# Patient Record
Sex: MALE | Race: WHITE | NOT HISPANIC OR LATINO | Employment: FULL TIME | URBAN - NONMETROPOLITAN AREA
[De-identification: names, ages, dates, MRNs, and addresses within clinical notes are randomized per-mention and may not be internally consistent; named-entity substitution may affect disease eponyms.]

---

## 2018-02-02 ENCOUNTER — DOCUMENTATION ONLY (OUTPATIENT)
Dept: FAMILY MEDICINE | Facility: OTHER | Age: 23
End: 2018-02-02

## 2018-02-02 PROBLEM — J30.1 ALLERGIC RHINITIS DUE TO POLLEN: Status: ACTIVE | Noted: 2018-02-02

## 2018-02-02 PROBLEM — E66.3 OVERWEIGHT: Status: ACTIVE | Noted: 2018-02-02

## 2018-02-02 RX ORDER — IBUPROFEN 800 MG/1
800 TABLET, FILM COATED ORAL 3 TIMES DAILY PRN
COMMUNITY
Start: 2014-03-12 | End: 2019-06-26

## 2018-02-02 RX ORDER — LORATADINE 10 MG/1
10 TABLET ORAL DAILY
COMMUNITY
Start: 2016-08-19 | End: 2019-10-07

## 2019-06-26 ENCOUNTER — OFFICE VISIT (OUTPATIENT)
Dept: FAMILY MEDICINE | Facility: OTHER | Age: 24
End: 2019-06-26
Attending: NURSE PRACTITIONER
Payer: COMMERCIAL

## 2019-06-26 VITALS
SYSTOLIC BLOOD PRESSURE: 126 MMHG | HEART RATE: 84 BPM | RESPIRATION RATE: 20 BRPM | WEIGHT: 305.1 LBS | DIASTOLIC BLOOD PRESSURE: 78 MMHG | BODY MASS INDEX: 46.24 KG/M2 | HEIGHT: 68 IN | TEMPERATURE: 98.8 F

## 2019-06-26 DIAGNOSIS — L25.9 CONTACT DERMATITIS, UNSPECIFIED CONTACT DERMATITIS TYPE, UNSPECIFIED TRIGGER: Primary | ICD-10-CM

## 2019-06-26 PROCEDURE — 99213 OFFICE O/P EST LOW 20 MIN: CPT | Performed by: NURSE PRACTITIONER

## 2019-06-26 PROCEDURE — G0463 HOSPITAL OUTPT CLINIC VISIT: HCPCS

## 2019-06-26 RX ORDER — HYDROCODONE BITARTRATE AND ACETAMINOPHEN 7.5; 325 MG/1; MG/1
TABLET ORAL
Refills: 0 | COMMUNITY
Start: 2019-06-14 | End: 2020-04-09

## 2019-06-26 RX ORDER — TRIAMCINOLONE ACETONIDE 1 MG/G
OINTMENT TOPICAL 2 TIMES DAILY
Qty: 60 G | Refills: 0 | Status: SHIPPED | OUTPATIENT
Start: 2019-06-26 | End: 2019-10-07

## 2019-06-26 ASSESSMENT — MIFFLIN-ST. JEOR: SCORE: 2343.42

## 2019-06-26 ASSESSMENT — PAIN SCALES - GENERAL: PAINLEVEL: NO PAIN (0)

## 2019-06-26 NOTE — PATIENT INSTRUCTIONS
"Patient Education     Contact Dermatitis  Contact dermatitis is a skin rash caused by something that touches the skin and makes it irritated and inflamed. Your skin may be red, swollen, dry, and may be cracked. Blisters may form and ooze. The rash will itch.  Contact dermatitis can form on the face and neck, backs of hands, forearms, genitals, and lower legs.  People can get contact dermatitis from lots of sources. These include:    Plants such as poison ivy, oak, or sumac    Chemicals in hair dyes and rinses, soaps, solvents, waxes, fingernail polish, and deodorants     Jewelry or watchbands made of nickel  Contact dermatitis is not passed from person to person.  Talk with your healthcare provider about what may have caused the rash. A type of allergy testing called \"patch testing\" may be used to discover what you are allergic to. You will need to avoid the source of your rash in the future to prevent it from coming back.  Treatment is done to relieve itching and prevent the rash from coming back. The rash should go away in a few days to a few weeks.  Home care  Your healthcare provider may prescribe medicine to relieve swelling and itching. Follow all instructions when using these medicines.  General care:    Avoid anything that heats up your skin, such as hot showers or baths, or direct sunlight. This can make itching worse.    Apply cold compresses to soothe your sores to help relieve your symptoms. Do this for 30 minutes 3 to 4 times a day. You can make a cold compress by soaking a cloth in cold water. Squeeze out excess water. You can add colloidal oatmeal to the water to help reduce itching. For severe itching in a small area, apply an ice pack wrapped in a thin towel. Do this for 20 minutes 3 to 4 times a day.    You can also try wet dressings. One way to do this is to wear a wet piece of clothing under a dry one. Wear a damp shirt under a dry shirt if your upper body is affected. This can relieve itching " and prevent you from scratching the affected area.    You can also help relieve large areas of itching by taking a lukewarm bath with colloidal oatmeal added to the water.    Use hydrocortisone cream for redness and irritation, unless another medicine was prescribed. You can also use benzocaine anesthetic cream or spray. Calamine lotion can also relieve mild symptoms.    Use oral diphenhydramine to help reduce itching. You can buy this antihistamine at drug and grocery stores. It can make you sleepy, so use lower doses during the daytime. Or you can use loratadine. This is an antihistamine that will not make you sleepy. Do not use diphenhydramine if you have glaucoma or have trouble urinating due to an enlarged prostate.    If a plant causes your rash, make sure to wash your skin and the clothes you were wearing when you came into contact with the plant. This is to wash away the plant oils that gave you the rash and prevent more or worse symptoms.    Stay away from the substance or object that causes your symptoms. If you can t avoid it, wear gloves or some other type of protection.  Follow-up care  Follow up with your healthcare provider, or as advised.  When to seek medical advice  Call your healthcare provider right away if any of these occur:    Spreading of the rash to other parts of your body    Severe swelling of your face, eyelids, mouth, throat or tongue    Trouble urinating due to swelling in the genital area    Fever of 100.4 F (38 C) or higher    Redness or swelling that gets worse    Pain that gets worse    Foul-smelling fluid leaking from the skin    Yellow-brown crusts on the open blisters  Date Last Reviewed: 9/1/2016 2000-2018 [employer]. [address] [place] [address], [place] [address]. All rights reserved. This information is not intended as a substitute for professional medical care. Always follow your healthcare professional's instructions.

## 2019-06-26 NOTE — NURSING NOTE
Patient presents to the clinic for rash on arms that started last night. Patient reports the rash being itchy and has put on isopropyl for treatment.  Medication Reconciliation: complete    Shirley Cobian, CMA

## 2019-06-26 NOTE — PROGRESS NOTES
Nursing Notes:   Shirley Cobian CMA  6/26/2019  4:07 PM  Sign at exiting of workspace  Patient presents to the clinic for rash on arms that started last night. Patient reports the rash being itchy and has put on isopropyl for treatment.  Medication Reconciliation: complete    Shirley Cobian CMA        SUBJECTIVE:   Nathan Wells is a 24 year old male who presents to clinic today for the following health issues:    Patient presents to the [facility] this evening with a rash on his arms.  He reports that it has been there for the last 5 days.  He reports it is itchy.  There is no pain.  He denies contact with anything new in his house, there is no new soaps, detergents, closed, household products or food.  No new pets or pet products.  However he did start a new job and there could be something there that is causing the rash.  There is no fevers, chills, cough, shortness of breath or wheezing, no nasal congestion.      Problem list and histories reviewed & adjusted, as indicated.  Additional history: as documented    Patient Active Problem List   Diagnosis     Allergic rhinitis due to pollen     Ankle pain     Overweight     Past Surgical History:   Procedure Laterality Date     ADENOIDECTOMY      No Comments Provided     MYRINGOTOMY, INSERT TUBE, COMBINED      12/27/05,Vent tubes   4 times.       Social History     Tobacco Use     Smoking status: Never Smoker     Smokeless tobacco: Never Used   Substance Use Topics     Alcohol use: No     Family History   Problem Relation Age of Onset     Family History Negative Mother         Good Health     Cancer Father         Cancer,brain cancer     Heart Disease Other         Heart Disease         Current Outpatient Medications   Medication Sig Dispense Refill     triamcinolone (KENALOG) 0.1 % external ointment Apply topically 2 times daily for 10 days 60 g 0     HYDROcodone-acetaminophen (NORCO) 7.5-325 MG per tablet TAKE 1 TAB BY MOUTH EVERY 4-6 HOURS AS NEEDED FOR  "PAIN  0     loratadine (CLARITIN) 10 MG tablet Take 10 mg by mouth daily       Allergies   Allergen Reactions     Erythromycin-Sulfisoxazole Unknown     Pediatric Multivitamins-Iron Unknown     Sulfa Drugs Unknown     Allergies to pediazole     Amoxicillin Rash         ROS:  Notable findings in the HPI.       OBJECTIVE:     /78 (BP Location: Left arm, Patient Position: Sitting, Cuff Size: Adult Large)   Pulse 84   Temp 98.8  F (37.1  C) (Tympanic)   Resp 20   Ht 1.727 m (5' 8\")   Wt 138.4 kg (305 lb 1.6 oz)   BMI 46.39 kg/m    Body mass index is 46.39 kg/m .  GENERAL: healthy, alert and no distress  EYES: Eyes grossly normal to inspection  HENT: normal cephalic/atraumatic and oral mucous membranes moist  RESP: Without increased work of breathing  CV: regular rates and rhythm and no peripheral edema  SKIN:  Examination of the rash reveals: Eczema:  dry, slightly raised, red patches with mild flaking noted on posterior surface of his forearms.   PSYCH: mentation appears normal, affect normal/bright    Diagnostic Test Results:  none     ASSESSMENT/PLAN:     1. Contact dermatitis, unspecified contact dermatitis type, unspecified trigger  - triamcinolone (KENALOG) 0.1 % external ointment; Apply topically 2 times daily for 10 days  Dispense: 60 g; Refill: 0      PLAN:    Rash:  moisturizer  Reassurance was given to the patient  Zyrtec 10mg daily suggested for itchy rash.  Rx    Followup:    If not improving or if condition worsens, follow up with your Primary Care Provider    I explained my diagnostic considerations and recommendations to the patient, who voiced understanding and agreement with the treatment plan. All questions were answered. We discussed potential side effects of any prescribed or recommended therapies, as well as expectations for response to treatments. He was advised to contact our office if there is no improvement or worsening of conditions or symptoms.  If s/s worsen or persist, patient " will either come back or follow up with PCP.    Disclaimer:  This note consists of words and symbols derived from keyboarding, dictation, or using voice recognition software. As a result, there may be errors in the script that have gone undetected. Please consider this when interpreting information found in this note.      Barbara Raza NP, 6/26/2019 4:11 PM

## 2019-10-07 ENCOUNTER — OFFICE VISIT (OUTPATIENT)
Dept: FAMILY MEDICINE | Facility: OTHER | Age: 24
End: 2019-10-07
Attending: NURSE PRACTITIONER
Payer: COMMERCIAL

## 2019-10-07 VITALS
BODY MASS INDEX: 47.27 KG/M2 | HEART RATE: 101 BPM | HEIGHT: 68 IN | SYSTOLIC BLOOD PRESSURE: 128 MMHG | RESPIRATION RATE: 20 BRPM | TEMPERATURE: 98.4 F | DIASTOLIC BLOOD PRESSURE: 76 MMHG | OXYGEN SATURATION: 96 % | WEIGHT: 311.9 LBS

## 2019-10-07 DIAGNOSIS — J30.2 SEASONAL ALLERGIC RHINITIS, UNSPECIFIED TRIGGER: ICD-10-CM

## 2019-10-07 DIAGNOSIS — L30.4 INTERTRIGO: Primary | ICD-10-CM

## 2019-10-07 PROCEDURE — 99213 OFFICE O/P EST LOW 20 MIN: CPT | Performed by: NURSE PRACTITIONER

## 2019-10-07 PROCEDURE — G0463 HOSPITAL OUTPT CLINIC VISIT: HCPCS

## 2019-10-07 RX ORDER — NYSTATIN 100000 U/G
OINTMENT TOPICAL 2 TIMES DAILY
Qty: 60 G | Refills: 0 | Status: SHIPPED | OUTPATIENT
Start: 2019-10-07 | End: 2020-05-24

## 2019-10-07 RX ORDER — LORATADINE 10 MG/1
10 TABLET ORAL DAILY
Qty: 90 TABLET | Refills: 0 | Status: SHIPPED | OUTPATIENT
Start: 2019-10-07 | End: 2022-05-17

## 2019-10-07 ASSESSMENT — PAIN SCALES - GENERAL: PAINLEVEL: NO PAIN (0)

## 2019-10-07 ASSESSMENT — MIFFLIN-ST. JEOR: SCORE: 2374.27

## 2019-10-07 NOTE — NURSING NOTE
"Chief Complaint   Patient presents with     Derm Problem     left armpit     Patient is here for an odor in his left armpit that was noticed 3 days ago. Patient states that deodorent is not helping. Patient states no more itching than usual and no discharge from the armpit.     Initial /76   Pulse 101   Temp 98.4  F (36.9  C) (Tympanic)   Resp 20   Ht 1.727 m (5' 8\")   Wt 141.5 kg (311 lb 14.4 oz)   SpO2 96%   BMI 47.42 kg/m   Estimated body mass index is 47.42 kg/m  as calculated from the following:    Height as of this encounter: 1.727 m (5' 8\").    Weight as of this encounter: 141.5 kg (311 lb 14.4 oz).  Medication Reconciliation: complete    Jerilyn Ames LPN  "

## 2019-10-07 NOTE — PROGRESS NOTES
"HPI:    Nathan Wells is a 24 year old male  who presents to clinic today for skin concern.    Noted some mild odor from his left axillary area a few days ago.  Describes some mild irritation.  No burning sensation.  Not itching.  Mild stinging with hot water.  No new deodorants.  No topical treatments tried.       Past Medical History:   Diagnosis Date     Constipation     3/23/05,started on MiraLax and behavioral modification treatments     Encounter for adjustment and management of other implanted devices     3/23/05,T tube is out     Otitis media     History of recurrent ear infections     Past Surgical History:   Procedure Laterality Date     ADENOIDECTOMY      No Comments Provided     MYRINGOTOMY, INSERT TUBE, COMBINED      12/27/05,Vent tubes   4 times.     Social History     Tobacco Use     Smoking status: Never Smoker     Smokeless tobacco: Never Used   Substance Use Topics     Alcohol use: No     Current Outpatient Medications   Medication Sig Dispense Refill     loratadine (CLARITIN) 10 MG tablet Take 10 mg by mouth daily       HYDROcodone-acetaminophen (NORCO) 7.5-325 MG per tablet TAKE 1 TAB BY MOUTH EVERY 4-6 HOURS AS NEEDED FOR PAIN  0     Allergies   Allergen Reactions     Erythromycin-Sulfisoxazole Unknown     Pediatric Multivitamins-Iron Unknown     Sulfa Drugs Unknown     Allergies to pediazole     Amoxicillin Rash         Past medical history, past surgical history, current medications and allergies reviewed and accurate to the best of my knowledge.        ROS:  Refer to HPI    /76   Pulse 101   Temp 98.4  F (36.9  C) (Tympanic)   Resp 20   Ht 1.727 m (5' 8\")   Wt 141.5 kg (311 lb 14.4 oz)   SpO2 96%   BMI 47.42 kg/m      EXAM:  General Appearance: Well appearing mildly obese adult male, appropriate appearance for age. No acute distress  Orophayrnx: voice clear  Respiratory:  Normal effort. No cough appreciated.  Musculoskeletal:  Equal movement of bilateral upper extremities.  " Equal movement of bilateral lower extremities.  Normal gait.    Dermatological: Left axillary area with erythematous macerated plaque  Psychological: normal affect, alert and pleasant          ASSESSMENT/PLAN:    ICD-10-CM    1. Intertrigo L30.4 nystatin (MYCOSTATIN) 280413 UNIT/GM external ointment   2. Seasonal allergic rhinitis, unspecified trigger J30.2 loratadine (CLARITIN) 10 MG tablet       Nystatin ointment BID to left axillary area  Keep skin folds cool and dry  Follow up if symptoms persist or worsen or concerns    Patient requested refill of Loratadine for seasonal allergies.  Rx for 90 day supply.            Disclaimer:  This note consists of words and symbols derived from keyboarding, dictation, or using voice recognition software. As a result, there may be errors in the script that have gone undetected. Please consider this when interpreting information found in this note.

## 2020-02-22 ENCOUNTER — OFFICE VISIT (OUTPATIENT)
Dept: FAMILY MEDICINE | Facility: OTHER | Age: 25
End: 2020-02-22
Attending: NURSE PRACTITIONER
Payer: COMMERCIAL

## 2020-02-22 VITALS
WEIGHT: 315 LBS | BODY MASS INDEX: 47.74 KG/M2 | TEMPERATURE: 98.7 F | HEIGHT: 68 IN | RESPIRATION RATE: 16 BRPM | HEART RATE: 120 BPM | SYSTOLIC BLOOD PRESSURE: 130 MMHG | DIASTOLIC BLOOD PRESSURE: 78 MMHG | OXYGEN SATURATION: 96 %

## 2020-02-22 DIAGNOSIS — J02.0 STREPTOCOCCAL SORE THROAT: ICD-10-CM

## 2020-02-22 DIAGNOSIS — R07.0 THROAT PAIN: Primary | ICD-10-CM

## 2020-02-22 LAB
SPECIMEN SOURCE: ABNORMAL
STREP GROUP A PCR: ABNORMAL

## 2020-02-22 PROCEDURE — G0463 HOSPITAL OUTPT CLINIC VISIT: HCPCS

## 2020-02-22 PROCEDURE — 99213 OFFICE O/P EST LOW 20 MIN: CPT | Performed by: NURSE PRACTITIONER

## 2020-02-22 PROCEDURE — 87651 STREP A DNA AMP PROBE: CPT | Mod: ZL | Performed by: NURSE PRACTITIONER

## 2020-02-22 RX ORDER — AZITHROMYCIN 250 MG/1
TABLET, FILM COATED ORAL
Qty: 6 TABLET | Refills: 0 | Status: SHIPPED | OUTPATIENT
Start: 2020-02-22 | End: 2020-02-29

## 2020-02-22 RX ORDER — AZITHROMYCIN 250 MG/1
TABLET, FILM COATED ORAL
Qty: 6 TABLET | Refills: 0 | Status: SHIPPED | OUTPATIENT
Start: 2020-02-22 | End: 2020-02-22

## 2020-02-22 ASSESSMENT — MIFFLIN-ST. JEOR: SCORE: 2396.5

## 2020-02-22 ASSESSMENT — PAIN SCALES - GENERAL: PAINLEVEL: NO PAIN (1)

## 2020-02-22 NOTE — NURSING NOTE
Patient has not been feeling well for 11 hours.  Their symptoms are sore throat.   Sarika Colindres LPN LPN....................  2/22/2020   5:40 PM

## 2020-02-23 NOTE — PATIENT INSTRUCTIONS
Sore Throat   What is a sore throat?   Sore throat is a common symptom that ranges in severity from just a sense of scratchiness to severe pain.   Pharyngitis is the medical term for sore throat.   How does it occur?   Sore throat is caused by inflammation of the throat (pharynx). The pharynx is the area behind the tonsils. A sore throat may be the first symptom of usually mild illnesses such as a cold or the flu or of more severe illnesses such as mononucleosis or scarlet fever.   A sore throat that comes on suddenly is called acute pharyngitis. It can be caused by bacteria or viruses. A sore throat that lasts for a long time is called chronic pharyngitis. It occurs when a respiratory, sinus, or mouth infection spreads to the throat.   Sore throats can also be caused by:   hay fever   cigarette smoking or secondhand smoke   breathing heavily polluted air or chemical fumes   swallowing sharp foods that hurt the lining of the throat, such as a tortilla chip   dry air   heartburn (gastric reflux).   What are the symptoms?   Symptoms may include:   a raw feeling in the throat that makes breathing, swallowing, and speaking painful   redness of the throat   fever   hoarseness   pus in your throat   tender, swollen glands in your neck   earache (you may feel pain in your ears even though the problem is in your throat).   How is it diagnosed?   Your health care provider will ask about your symptoms and examine your throat. Your provider also will examine you for signs of other illness, such as sinus, chest, or ear infections.   Just by looking at your throat, it is often hard for your health care provider to decide whether a virus or bacteria are causing your sore throat. Your provider may swab your throat to test for strep infection.   How is it treated?   Usually no specific medical treatment is needed if a virus is causing the sore throat. The throat most often gets better on its own within 5 to 7 days. Antibiotic  medicine does not cure viral pharyngitis.   For acute pharyngitis caused by bacteria, your health care provider may prescribe an antibiotic.   For chronic pharyngitis, your provider will look for other causes.   How long will the effects last?   Viral pharyngitis often goes away in 5 to 7 days.   If you have bacterial pharyngitis, you will feel better after you have taken antibiotics for 2 to 3 days. You must, though, take all of your antibiotic even when you are feeling better. If you don't take all of it, your sore throat could come back.   How can I take care of myself?   Do not smoke.   Avoid secondhand smoke and other air pollutants.   Use a cool mist humidifier to add moisture to the air.   Get plenty of rest.   You may want to rest your throat by talking less and eating a diet that is mostly liquid or soft for a day or two.   Nonprescription throat lozenges and mouthwashes should help relieve the soreness.   Gargling with warm saltwater and drinking warm liquids may help. (You can make a saltwater solution by adding a half teaspoon of salt to 8 ounces of warm water.)   A nonprescription pain reliever such as aspirin, acetaminophen, or ibuprofen may ease general aches and pains. Children under 18 years of age should not take aspirin or products containing salicylate (such as Pepto-Bismol) because of the risk of Reye's syndrome unless recommended by a healthcare provider.   If your sore throat lasts for more then a few days, call your health care provider.   How can I prevent a sore throat?   The following suggestions may help prevent a sore throat:   Don't share eating and drinking utensils with others.   Wash your hands often.   Don't let your nose or mouth touch public telephones or drinking fountains.   Avoid close contact with other people who have a sore throat.   Stay indoors as much as possible on high-pollution days.   Don't stay in areas where there is heavy smoke from cigarettes.   Use a humidifier  in your home if the air is quite dry.   Copyright   2006 [employer] and/or one of its subsidiaries. All Rights Reserved.

## 2020-02-23 NOTE — PROGRESS NOTES
Nursing Notes:   Sarika Colindres LPN  2/22/2020  6:04 PM  Signed  Patient has not been feeling well for 11 hours.  Their symptoms are sore throat.   Sarika Colindres LPN LPN....................  2/22/2020   5:40 PM        SUBJECTIVE:   Nathan Wells is a 24 year old male who presents to clinic today for the following health issues:    Patient presents to the rapid clinic for evaluation of illness.  He has had sore throat and throat irritation for the last 2 days worsening for the last 12 hours.  He denies fevers, chills, cough, shortness of breath or wheezing.  There is no nasal congestion.  He is eating and drinking well.  He has been around 2 others that have been diagnosed with strep throat recently.      Problem list and histories reviewed & adjusted, as indicated.  Additional history: as documented    Patient Active Problem List   Diagnosis     Allergic rhinitis due to pollen     Ankle pain     Overweight     Past Surgical History:   Procedure Laterality Date     ADENOIDECTOMY      No Comments Provided     MYRINGOTOMY, INSERT TUBE, COMBINED      12/27/05,Vent tubes   4 times.       Social History     Tobacco Use     Smoking status: Never Smoker     Smokeless tobacco: Never Used   Substance Use Topics     Alcohol use: No     Family History   Problem Relation Age of Onset     Family History Negative Mother         Good Health     Cancer Father         Cancer,brain cancer     Heart Disease Other         Heart Disease         Current Outpatient Medications   Medication Sig Dispense Refill     azithromycin (ZITHROMAX Z-SHERIF) 250 MG tablet Take 500 mg day one and days 2-5 take 250 mg 6 tablet 0     HYDROcodone-acetaminophen (NORCO) 7.5-325 MG per tablet TAKE 1 TAB BY MOUTH EVERY 4-6 HOURS AS NEEDED FOR PAIN  0     loratadine (CLARITIN) 10 MG tablet Take 1 tablet (10 mg) by mouth daily 90 tablet 0     nystatin (MYCOSTATIN) 664995 UNIT/GM external ointment Apply topically 2 times daily 60 g 0     Allergies  "  Allergen Reactions     Erythromycin-Sulfisoxazole Unknown     Pediatric Multivitamins-Iron Unknown     Sulfa Drugs Unknown     Allergies to pediazole     Amoxicillin Rash         ROS:  Notable findings in the HPI.       OBJECTIVE:     /78 (BP Location: Right arm, Patient Position: Sitting, Cuff Size: Adult Regular)   Pulse 120   Temp 98.7  F (37.1  C) (Tympanic)   Resp 16   Ht 1.727 m (5' 8\")   Wt 143.7 kg (316 lb 12.8 oz)   SpO2 96%   BMI 48.17 kg/m    Body mass index is 48.17 kg/m .  GENERAL: alert and no distress  EYES: Eyes grossly normal to inspection  HENT: normal cephalic/atraumatic, right ear: normal: no effusions, no erythema, normal landmarks, left ear: normal: no effusions, no erythema, normal landmarks, nose and mouth without ulcers or lesions, oropharynx clear, oral mucous membranes moist and Uvula arises midline. No exudate, no excessive redness tonsils are +1 and equal.   NECK: no adenopathy  RESP: lungs clear to auscultation - no rales, rhonchi or wheezes  CV: regular rates and rhythm, normal S1 S2, no S3 or S4, no murmur, click or rub, peripheral pulses strong and no peripheral edema  SKIN: no suspicious lesions or rashes  PSYCH: mentation appears normal, affect normal/bright    Diagnostic Test Results:  Results for orders placed or performed in visit on 02/22/20 (from the past 24 hour(s))   Group A Streptococcus PCR Throat Swab   Result Value Ref Range    Specimen Description Throat     Strep Group A PCR Detected, Abnormal Result (A) NDET^Not Detected     Strep PCR in process.     ASSESSMENT/PLAN:     1. Throat pain  - Group A Streptococcus PCR Throat Swab    2. Viral illness    3. Strep throat: Azithromycin sent to pharmacy. Patient called and told to start RX.     PLAN:    URI Adult:  Tylenol, Ibuprofen, Fluids, Rest, OTC decongestant/antihistamine, Saline gargles, Saline nasal spray and Vaporizer  Will call with Strep test results. Treat if needed.     Followup:    If not " improving or if condition worsens, follow up with your Primary Care Provider    I explained my diagnostic considerations and recommendations to the patient, who voiced understanding and agreement with the treatment plan. All questions were answered. We discussed potential side effects of any prescribed or recommended therapies, as well as expectations for response to treatments. He was advised to contact our office if there is no improvement or worsening of conditions or symptoms.  If s/s worsen or persist, patient will either come back or follow up with PCP.    Disclaimer:  This note consists of words and symbols derived from keyboarding, dictation, or using voice recognition software. As a result, there may be errors in the script that have gone undetected. Please consider this when interpreting information found in this note.      Barbara Raza NP, 2/22/2020 6:05 PM

## 2020-02-29 ENCOUNTER — HOSPITAL ENCOUNTER (EMERGENCY)
Facility: OTHER | Age: 25
Discharge: HOME | End: 2020-03-01
Attending: EMERGENCY MEDICINE | Admitting: EMERGENCY MEDICINE
Payer: COMMERCIAL

## 2020-02-29 DIAGNOSIS — J02.0 STREPTOCOCCAL PHARYNGITIS: ICD-10-CM

## 2020-02-29 LAB
FLUAV+FLUBV RNA SPEC QL NAA+PROBE: NEGATIVE
FLUAV+FLUBV RNA SPEC QL NAA+PROBE: NEGATIVE
RSV RNA SPEC NAA+PROBE: NEGATIVE
SPECIMEN SOURCE: ABNORMAL
SPECIMEN SOURCE: NORMAL
STREP GROUP A PCR: ABNORMAL

## 2020-02-29 PROCEDURE — 96372 THER/PROPH/DIAG INJ SC/IM: CPT | Performed by: EMERGENCY MEDICINE

## 2020-02-29 PROCEDURE — 87631 RESP VIRUS 3-5 TARGETS: CPT | Performed by: EMERGENCY MEDICINE

## 2020-02-29 PROCEDURE — 99283 EMERGENCY DEPT VISIT LOW MDM: CPT | Mod: Z6 | Performed by: EMERGENCY MEDICINE

## 2020-02-29 PROCEDURE — 87651 STREP A DNA AMP PROBE: CPT | Performed by: EMERGENCY MEDICINE

## 2020-02-29 PROCEDURE — 99284 EMERGENCY DEPT VISIT MOD MDM: CPT | Performed by: EMERGENCY MEDICINE

## 2020-02-29 RX ORDER — CEFDINIR 300 MG/1
300 CAPSULE ORAL 2 TIMES DAILY
Qty: 20 CAPSULE | Refills: 0 | Status: SHIPPED | OUTPATIENT
Start: 2020-02-29 | End: 2020-04-09

## 2020-02-29 NOTE — ED AVS SNAPSHOT
[facility]  [address]  [place] [address]  Phone:  [phone]  Fax:  [phone]                                    Nathan Wells   MRN: [id number]    Department:  [facility]   Date of Visit:  2/29/2020           After Visit Summary Signature Page    I have received my discharge instructions, and my questions have been answered. I have discussed any challenges I see with this plan with the nurse or doctor.    ..........................................................................................................................................  Patient/Patient Representative Signature      ..........................................................................................................................................  Patient Representative Print Name and Relationship to Patient    ..................................................               ................................................  Date                                   Time    ..........................................................................................................................................  Reviewed by Signature/Title    ...................................................              ..............................................  Date                                               Time          22EPIC Rev 08/18

## 2020-03-01 VITALS
TEMPERATURE: 99.1 F | OXYGEN SATURATION: 99 % | HEART RATE: 98 BPM | RESPIRATION RATE: 16 BRPM | BODY MASS INDEX: 48.05 KG/M2 | WEIGHT: 315 LBS | DIASTOLIC BLOOD PRESSURE: 99 MMHG | SYSTOLIC BLOOD PRESSURE: 147 MMHG

## 2020-03-01 PROCEDURE — 25000128 H RX IP 250 OP 636: Performed by: EMERGENCY MEDICINE

## 2020-03-01 PROCEDURE — 96372 THER/PROPH/DIAG INJ SC/IM: CPT | Performed by: EMERGENCY MEDICINE

## 2020-03-01 RX ORDER — CEFTRIAXONE SODIUM 1 G
1 VIAL (EA) INJECTION ONCE
Status: COMPLETED | OUTPATIENT
Start: 2020-03-01 | End: 2020-03-01

## 2020-03-01 RX ORDER — KETOROLAC TROMETHAMINE 30 MG/ML
60 INJECTION, SOLUTION INTRAMUSCULAR; INTRAVENOUS ONCE
Status: COMPLETED | OUTPATIENT
Start: 2020-03-01 | End: 2020-03-01

## 2020-03-01 RX ADMIN — KETOROLAC TROMETHAMINE 60 MG: 30 INJECTION, SOLUTION INTRAMUSCULAR at 00:08

## 2020-03-01 RX ADMIN — CEFTRIAXONE SODIUM 1 G: 1 INJECTION, POWDER, FOR SOLUTION INTRAMUSCULAR; INTRAVENOUS at 00:08

## 2020-03-01 ASSESSMENT — ENCOUNTER SYMPTOMS
CHEST TIGHTNESS: 0
AGITATION: 0
SORE THROAT: 1
LIGHT-HEADEDNESS: 0
BACK PAIN: 0
FEVER: 0
DYSURIA: 0
VOMITING: 0
SHORTNESS OF BREATH: 0
CHILLS: 0

## 2020-03-01 NOTE — ED TRIAGE NOTES
Pt here by himself with c/o lt sided throat pain that radiates to ear that woke him up this AM, pt reports recently being treated for strep, VSS, pt brought back into ER to be evaluated

## 2020-03-01 NOTE — ED PROVIDER NOTES
History     Chief Complaint   Patient presents with     Pharyngitis     Otalgia     HPI  Nathan AKIRA Wells is a 24 year old male who sore throat.  He was treated for strep with 5 days of azithromycin about a week ago.  He said that he did feel better while on it, however he is again getting similar symptoms.  He is complaining of pain in the right side of his throat.  When he swallows the pain will go from his ear down into his neck area.  Has not noticed any fevers, however he does have a low-grade temp of 99.1.  It is quite painful.  He is able to drink liquids and is staying hydrated.  Has not noticed any swelling.  Breathing easily.    Allergies:  Allergies   Allergen Reactions     Erythromycin-Sulfisoxazole Unknown     Pediatric Multivitamins-Iron Unknown     Sulfa Drugs Unknown     Allergies to pediazole     Amoxicillin Rash       Problem List:    Patient Active Problem List    Diagnosis Date Noted     Allergic rhinitis due to pollen 02/02/2018     Priority: Medium     Overweight 02/02/2018     Priority: Medium     Overview:   Overweight, with BMI of 28       Ankle pain 06/26/2013     Priority: Medium        Past Medical History:    Past Medical History:   Diagnosis Date     Constipation      Encounter for adjustment and management of other implanted devices      Otitis media        Past Surgical History:    Past Surgical History:   Procedure Laterality Date     ADENOIDECTOMY      No Comments Provided     MYRINGOTOMY, INSERT TUBE, COMBINED      12/27/05,Vent tubes   4 times.       Family History:    Family History   Problem Relation Age of Onset     Family History Negative Mother         Good Health     Cancer Father         Cancer,brain cancer     Heart Disease Other         Heart Disease       Social History:  Marital Status:  Single [1]  Social History     Tobacco Use     Smoking status: Never Smoker     Smokeless tobacco: Never Used   Substance Use Topics     Alcohol use: No     Drug use: Never      Comment: Drug use: No        Medications:    cefdinir (OMNICEF) 300 MG capsule  HYDROcodone-acetaminophen (NORCO) 7.5-325 MG per tablet  loratadine (CLARITIN) 10 MG tablet  piroxicam (FELDENE) 20 MG capsule  nystatin (MYCOSTATIN) 755325 UNIT/GM external ointment          Review of Systems   Constitutional: Negative for chills and fever.   HENT: Positive for sore throat. Negative for congestion.    Eyes: Negative for visual disturbance.   Respiratory: Negative for chest tightness and shortness of breath.    Cardiovascular: Negative for chest pain.   Gastrointestinal: Negative for vomiting.   Genitourinary: Negative for dysuria.   Musculoskeletal: Negative for back pain.   Skin: Negative for rash.   Neurological: Negative for light-headedness.   Psychiatric/Behavioral: Negative for agitation.       Physical Exam   BP: (!) 151/109  Pulse: 106  Temp: 99.1  F (37.3  C)  Resp: 16  Weight: 143.3 kg (316 lb)  SpO2: 99 %      Physical Exam  Vitals signs and nursing note reviewed.   Constitutional:       Appearance: He is well-developed.   HENT:      Head: Normocephalic and atraumatic.      Mouth/Throat:      Mouth: Mucous membranes are moist.      Pharynx: Uvula midline. Posterior oropharyngeal erythema present. No pharyngeal swelling, oropharyngeal exudate or uvula swelling.      Tonsils: No tonsillar exudate or tonsillar abscesses.   Eyes:      Conjunctiva/sclera: Conjunctivae normal.   Cardiovascular:      Rate and Rhythm: Normal rate.   Pulmonary:      Effort: Pulmonary effort is normal.   Skin:     General: Skin is warm and dry.   Neurological:      Mental Status: He is alert and oriented to person, place, and time.   Psychiatric:         Mood and Affect: Mood normal.         Behavior: Behavior normal.         ED Course        Procedures               Results for orders placed or performed during the hospital encounter of 02/29/20 (from the past 24 hour(s))   Group A Streptococcus PCR Throat Swab   Result Value Ref  Range    Specimen Description Throat     Strep Group A PCR Detected, Abnormal Result (A) NDET^Not Detected   Influenza A and B and RSV PCR   Result Value Ref Range    Specimen Description Nasopharyngeal     Influenza A PCR Negative NEG^Negative    Influenza B PCR Negative NEG^Negative    Resp Syncytial Virus Negative NEG^Negative       Medications   cefTRIAXone (ROCEPHIN) injection 1 g (has no administration in time range)   ketorolac (TORADOL) injection 60 mg (has no administration in time range)       Assessments & Plan (with Medical Decision Making)     I have reviewed the nursing notes.    I have reviewed the findings, diagnosis, plan and need for follow up with the patient.  Strep is positive, I am concerned that that he may have a resistant strep and the Zithromax did not treated adequately.  We will give him a 10-day course of some Omnicef.  Is asking for something for pain, he does not want narcotics.  We will try some piroxicam.  He can continue Tylenol every 6 hours as well.  Follow-up in clinic if worse or not improving.    New Prescriptions    CEFDINIR (OMNICEF) 300 MG CAPSULE    Take 1 capsule (300 mg) by mouth 2 times daily for 10 days    PIROXICAM (FELDENE) 20 MG CAPSULE    Take 1 capsule (20 mg) by mouth daily       Final diagnoses:   Streptococcal pharyngitis       2/29/2020   [facility]     Vic Aguilar MD  03/01/20 0008

## 2020-04-09 ENCOUNTER — OFFICE VISIT (OUTPATIENT)
Dept: FAMILY MEDICINE | Facility: OTHER | Age: 25
End: 2020-04-09
Attending: PHYSICIAN ASSISTANT
Payer: COMMERCIAL

## 2020-04-09 VITALS
OXYGEN SATURATION: 96 % | TEMPERATURE: 96.5 F | HEIGHT: 68 IN | DIASTOLIC BLOOD PRESSURE: 80 MMHG | HEART RATE: 97 BPM | RESPIRATION RATE: 18 BRPM | WEIGHT: 315 LBS | BODY MASS INDEX: 47.74 KG/M2 | SYSTOLIC BLOOD PRESSURE: 132 MMHG

## 2020-04-09 DIAGNOSIS — H57.12 EYE PAIN, LEFT: ICD-10-CM

## 2020-04-09 DIAGNOSIS — H11.422 CHEMOSIS OF LEFT CONJUNCTIVA: ICD-10-CM

## 2020-04-09 DIAGNOSIS — H10.12 ALLERGIC CONJUNCTIVITIS, LEFT: Primary | ICD-10-CM

## 2020-04-09 PROCEDURE — 25000132 ZZH RX MED GY IP 250 OP 250 PS 637: Performed by: PHYSICIAN ASSISTANT

## 2020-04-09 PROCEDURE — G0463 HOSPITAL OUTPT CLINIC VISIT: HCPCS

## 2020-04-09 PROCEDURE — 25000125 ZZHC RX 250: Performed by: PHYSICIAN ASSISTANT

## 2020-04-09 PROCEDURE — 99213 OFFICE O/P EST LOW 20 MIN: CPT | Performed by: PHYSICIAN ASSISTANT

## 2020-04-09 RX ORDER — OLOPATADINE HYDROCHLORIDE 2 MG/ML
1 SOLUTION/ DROPS OPHTHALMIC DAILY
Qty: 1 BOTTLE | Refills: 0 | Status: SHIPPED | OUTPATIENT
Start: 2020-04-09 | End: 2020-05-24

## 2020-04-09 RX ORDER — SODIUM CHLORIDE FOR INHALATION 0.9 %
3 VIAL, NEBULIZER (ML) INHALATION
Status: DISCONTINUED | OUTPATIENT
Start: 2020-04-09 | End: 2020-05-24

## 2020-04-09 RX ORDER — TETRACAINE HYDROCHLORIDE 5 MG/ML
1-2 SOLUTION OPHTHALMIC ONCE
Status: COMPLETED | OUTPATIENT
Start: 2020-04-09 | End: 2020-04-09

## 2020-04-09 RX ADMIN — FLUORESCEIN SODIUM 1 STRIP: 1 STRIP OPHTHALMIC at 16:42

## 2020-04-09 RX ADMIN — TETRACAINE HYDROCHLORIDE 2 DROP: 5 SOLUTION OPHTHALMIC at 16:44

## 2020-04-09 RX ADMIN — ISODIUM CHLORIDE 3 ML: 0.03 SOLUTION RESPIRATORY (INHALATION) at 16:44

## 2020-04-09 ASSESSMENT — MIFFLIN-ST. JEOR: SCORE: 2399.22

## 2020-04-09 ASSESSMENT — PAIN SCALES - GENERAL: PAINLEVEL: MILD PAIN (3)

## 2020-04-09 NOTE — PATIENT INSTRUCTIONS
Allergic conjunctivitis with chemosis  Treatment is symptomatic  Apply cool compress to eye several times daily  Start olopatadine antihistamine drops to left eye, apply one drop, once daily. Treat 1-2 days past clear  Can also take an oral antihistamine such as cetirizine 10 mg once daily   Avoid itching & rubbing eyes  Follow up with ophthalmology for persistence or worsening. Try [facility].   Seek immediate care for    Increased eyelid swelling    New or worsening drainage from the eye    Increasing redness around the eye    Facial swelling      Patient Education     Conjunctivitis, Allergic    Conjunctivitis is an irritation of a thin membrane in the eye. This membrane is called the conjunctiva. It covers the white of the eye and the inside of the eyelid. The condition is often called pink eye or red eye because the eye looks pink or red. The eye can also be swollen. A thick fluid may leak from the eyelid. The eye may itch and burn, and feel gritty or scratchy.  Allergic conjunctivitis is caused by an allergen. Allergens are substances that cause the body to react with certain symptoms. Allergens that cause eye irritation include things such as house dust or pollen in the air. This can occur seasonally, most often in the spring.  Home care    Eye drops may be prescribed to reduce itching and redness. Use these as directed. Otherwise, over-the-counter decongestant eye drops may be used.    Apply a cool compress (towel soaked in cool water) to the affected eye 3 to 4 times a day to reduce swelling and itching.    It is common to have mucus drainage during the night, causing the eyelids to become crusted by morning. Use a warm, wet cloth to wipe this away. You may also use saline irrigating solution or artificial tears to rinse away mucus in the eye. Don't patch the eye.    You may use acetaminophen or ibuprofen to control pain, unless another medicine was prescribed. (Note: If you have chronic liver  or kidney disease, or if you have ever had a stomach ulcer or gastrointestinal bleeding, talk with your healthcare provider before using these medicines.)    Don't wear contact lenses until your eyes have healed and all symptoms are gone.  Follow-up care  Follow up with your healthcare provider, or as advised.  When to seek medical advice  Call your healthcare provider right away if any of these occur:    Increased eyelid swelling    New or worsening drainage from the eye    Increasing redness around the eye    Facial swelling  Date Last Reviewed: 7/1/2017 2000-2019 [employer]. [address] [place] [address], [place] [address]. All rights reserved. This information is not intended as a substitute for professional medical care. Always follow your healthcare professional's instructions.

## 2020-04-09 NOTE — PROGRESS NOTES
"SUBJECTIVE:  23 yo male present to clinic for evaluation of left eye pain and redness  Onset 4 days ago. Course is worse today  Precipitating: no known injury or foreign body  Wears glasses but not contact lens  He tried to call his optomitrist for a check up ([facility] in [place], but this is currently closed due to Covid19). He last had his eyes checked about a year ago    Associated symptoms: sharp pain, ache, vision in left eye can be blurry at times.   Frequency: intermittent  Severity 2/10 with his eye closed or sometimes with reading a computer screen to 5/10 if her looks medially or laterally.   Aggravate: Lateral and medial gaze, light  Alleviate: keep his eye still and closing his eye lid  Treatments: ibuprofen 600 mg about 1 hour PTA which provides mild relief      OBJECTIVE:  Vitals:    04/09/20 1606   BP: 132/80   Pulse: 97   Resp: 18   Temp: 96.5  F (35.8  C)   TempSrc: Tympanic   SpO2: 96%   Weight: 144 kg (317 lb 6.4 oz)   Height: 1.727 m (5' 8\")     VITAL SIGNS: WNL - afebrile.  GENERAL:  In no acute distress.   SKIN: Unremarkable.  Eyes: Left eye injection lateral corner with mild chemosis. LUI, EOMI, normal fundi bilaterally  Visual field is full.   Eye acuity with glasses Right eye 10/12.5, left eye 10/16        ASSESSMENT:    1. Allergic conjunctivitis, left  2. Chemosis of left conjunctiva    Onset of eye redness and discomfort 4 days ago, course is worse today. He relates he is having a really bad allergy season. He has been taking cetirizine 10 mg daily. In clinic patient is frequently rubbing his left eye.   On exam left lateral conjunctiva injection and chemosis.  No foreign body or abrasion noted on exam.   Will treat symptomatically for allergic conjunctivitis and chemosis of left eye  Cool compress 4-5 times daily x 10 minutes  Antihistamine (Pataday) eye drops. 1 drop daily, treat for 1-2 days past iman  - olopatadine (PATADAY) 0.2 % ophthalmic solution; Place 1 drop Into " the left eye daily  Dispense: 1 Bottle; Refill: 0  Follow up with ophthalmologist if symptoms persist or worsen   ER for severe symptoms    3. Eye pain, left  - tetracaine (PONTOCAINE) 0.5 % ophthalmic solution 1-2 drop  - sodium chloride 0.9 % neb solution 3 mL  - fluorescein (FUL-ROSI) ophthalmic strip 1 strip    Eye exam: 1 drop tetracaine applied and eye irrigated with sterile saline. Eye lid inverted. No foreign body found. Fluorescence stain applied. No dye uptake noted.      Patient received verbal and written instruction including review of warning signs    Uma Issa PA-C on 4/9/2020 at 5:51 PM

## 2020-04-09 NOTE — NURSING NOTE
"Chief Complaint   Patient presents with     Eye Problem     Patient is here for pain and redness in his left eye that started 4 days ago. Patient states he \"feels like a muscle is being pulled\". Patient has been taking ibuprofen with some relief. Last dose was less than an hour ago.  VISION   Wears glasses: worn for testing  Tool used: Scott   Right eye:        10/12.5 (20/25)  Left eye:          10/16 (20/32)     Initial /80   Pulse 97   Temp 96.5  F (35.8  C) (Tympanic)   Resp 18   Ht 1.727 m (5' 8\")   Wt 144 kg (317 lb 6.4 oz)   SpO2 96%   BMI 48.26 kg/m   Estimated body mass index is 48.26 kg/m  as calculated from the following:    Height as of this encounter: 1.727 m (5' 8\").    Weight as of this encounter: 144 kg (317 lb 6.4 oz).  Medication Reconciliation: complete    Jerilyn Ames LPN  "

## 2020-05-24 ENCOUNTER — OFFICE VISIT (OUTPATIENT)
Dept: FAMILY MEDICINE | Facility: OTHER | Age: 25
End: 2020-05-24
Attending: PHYSICIAN ASSISTANT
Payer: COMMERCIAL

## 2020-05-24 VITALS
OXYGEN SATURATION: 97 % | DIASTOLIC BLOOD PRESSURE: 70 MMHG | WEIGHT: 315 LBS | TEMPERATURE: 97.7 F | SYSTOLIC BLOOD PRESSURE: 120 MMHG | BODY MASS INDEX: 47.74 KG/M2 | RESPIRATION RATE: 18 BRPM | HEIGHT: 68 IN | HEART RATE: 106 BPM

## 2020-05-24 DIAGNOSIS — K21.9 GASTROESOPHAGEAL REFLUX DISEASE, ESOPHAGITIS PRESENCE NOT SPECIFIED: Primary | ICD-10-CM

## 2020-05-24 DIAGNOSIS — H65.01 NON-RECURRENT ACUTE SEROUS OTITIS MEDIA OF RIGHT EAR: ICD-10-CM

## 2020-05-24 PROCEDURE — G0463 HOSPITAL OUTPT CLINIC VISIT: HCPCS

## 2020-05-24 PROCEDURE — 99214 OFFICE O/P EST MOD 30 MIN: CPT | Performed by: PHYSICIAN ASSISTANT

## 2020-05-24 RX ORDER — CEFDINIR 300 MG/1
300 CAPSULE ORAL 2 TIMES DAILY
Qty: 14 CAPSULE | Refills: 0 | Status: SHIPPED | OUTPATIENT
Start: 2020-05-24 | End: 2020-05-31

## 2020-05-24 RX ORDER — FAMOTIDINE 10 MG
10 TABLET ORAL 2 TIMES DAILY
Qty: 60 TABLET | Refills: 0 | Status: SHIPPED | OUTPATIENT
Start: 2020-05-24 | End: 2020-06-23

## 2020-05-24 ASSESSMENT — PAIN SCALES - GENERAL: PAINLEVEL: NO PAIN (0)

## 2020-05-24 ASSESSMENT — MIFFLIN-ST. JEOR: SCORE: 2396.94

## 2020-05-24 NOTE — NURSING NOTE
"Chief Complaint   Patient presents with     Otalgia     right ear     Patient is having right ear pain that started a few weeks ago. Patient states that it feels like his ear is plugged. Patient has a hx of ear infections.    Initial /70 (BP Location: Right arm, Patient Position: Sitting, Cuff Size: Adult Large)   Pulse 106   Temp 97.7  F (36.5  C) (Tympanic)   Resp 18   Ht 1.727 m (5' 8\")   Wt 144.2 kg (318 lb)   SpO2 97%   BMI 48.35 kg/m   Estimated body mass index is 48.35 kg/m  as calculated from the following:    Height as of this encounter: 1.727 m (5' 8\").    Weight as of this encounter: 144.2 kg (318 lb).    Medication Reconciliation: complete      Kirsten Vanessa LPN  "

## 2020-05-24 NOTE — PROGRESS NOTES
"SUBJECTIVE:  Nathan Wells is a 25 year old male presents to clinic for evaluation of right ear pain  Onset 2 weeks ago, course has been waxing and waning.   Associated symptoms: plugged hearing, right ear pain. Bad spring allergies this occurred after this was done.     OM history: 2-3 infections a year, He had 4 sets of PE tubes  Water exposures - none  Treatments: ibuprofen initially.   Eating and drinking normal  No fever or headache    Acid reflux symptoms: taking TUMS 2-4  times week. He is wondering if there is anything else he can try.   He has is trying not to eat before bed  No alcohols or tobacco, no drugs  Infrequent NSAID's      Past Medical History:   Diagnosis Date     Constipation     3/23/05,started on MiraLax and behavioral modification treatments     Encounter for adjustment and management of other implanted devices     3/23/05,T tube is out     Otitis media     History of recurrent ear infections     Current Outpatient Medications   Medication     loratadine (CLARITIN) 10 MG tablet     No current facility-administered medications for this visit.         Allergies   Allergen Reactions     Erythromycin-Sulfisoxazole Unknown     Pediatric Multivitamins-Iron Unknown     Sulfa Drugs Unknown     Allergies to pediazole     Amoxicillin Rash       ROS  General: feels well, no fever  HENT:  Right ear pain  Respiratory negative  Abdomen - esophageal reflux, symptoms worsening.   Skin - negative    OBJECTIVE:  Vitals:    05/24/20 1514   BP: 120/70   BP Location: Right arm   Patient Position: Sitting   Cuff Size: Adult Large   Pulse: 106   Resp: 18   Temp: 97.7  F (36.5  C)   TempSrc: Tympanic   SpO2: 97%   Weight: 144.2 kg (318 lb)   Height: 1.727 m (5' 8\")     General appearance: healthy, alert and NAD.    Eye: no injection or drainage  Ears: abnormal: R TM erythematous/yellow and bulging; L TM mild effusion  Nose: mucosal erythema and mucosal edema. No sinus tenderness  Oropharynx: mild " erythema  Neck: normal, supple and no adenopathy  Cardiac: normal RR, no murmur  Lungs: normal respiration, clear to ausculation  Abdomen: soft, non tender  Skin: no rash    ASSESSMENT:   Diagnosis Comments   1. Gastroesophageal reflux disease, esophagitis presence not specified  famotidine (PEPCID) 10 MG tablet      2. Non-recurrent acute serous otitis media of right ear  Cefdinir 300 mg twice daily x 7 days       History of esophageal reflux intermittent to moderate severity.  Will try Pepcid 10 mg twice daily for a month.   Dietary changes per AVS. Follow up with PCP in 1-2 weeks    Right otitis media - will treat with Cefdinir 300 mg twice daily x 7 day  Discussed symptomatic treatments per AVS  Follow up with PCP if symptoms persist or worsen  Patient received verbal and written instruction including review of warning signs    Uma Issa PA-C

## 2020-05-24 NOTE — PATIENT INSTRUCTIONS
Right ear pain  Will treat for otitis serous  Start amoxicillin 875 mg oral tablet, take twice daily for 7 days  Restart daily antihistamine for a week or as needed      GERD  Avoid gastric irritants, small frequent meals  Pepcid 10 mg twice daily x 30 days  Follow up with PCP for a recheck    Patient Education     GERD (Adult)    The esophagus is a tube that carries food from the mouth to the stomach. A valve (the LES, lower esophageal sphincter) at the lower end of the esophagus prevents stomach acid from flowing upward. When this valve doesn't work properly, stomach contents may repeatedly flow back up (reflux) into the esophagus. This is called gastroesophageal reflux disease (GERD). GERD can irritate the esophagus. It can cause problems with pain, swallowing or breathing. In severe cases, GERD can cause recurrent pneumonia (from aspiration or breathing in particles) or other serious problems.  Symptoms of reflux include burning, pressure or sharp pain in the upper abdomen or mid to lower chest. The pain can spread to the neck, back, or shoulder. There may be belching, an acid taste in the back of the throat, chronic cough, or sore throat, or hoarseness. GERD symptoms often occur during the day after a big meal. They can also occur at night when lying down.   Home care  Lifestyle changes can help reduce symptoms. If needed, your healthcare provider may prescribe medicines. Symptoms often improve with treatment, but if treatment is stopped, the symptoms often return after a few months. So most persons with GERD will need to continue treatment or get treatment on and off.  Lifestyle changes    Limit or avoid fatty, fried, and spicy foods, as well as coffee, chocolate, mint, and foods with high acid content such as tomatoes and citrus fruit and juices (orange, grapefruit, lemon).    Don t eat large meals, especially at night. Frequent, smaller meals are best. Don't lie down right after eating. And don t eat  "anything 3 hours before going to bed.    Don't drink alcohol or smoke. As much as possible, stay away from second hand smoke.    If you are overweight, losing weight will reduce symptoms.     Don't wear tight clothing around your stomach area.    If your symptoms occur during sleep, use a foam wedge to elevate your upper body (not just your head.) Or, place 4\" blocks under the head of your bed. Or use 2 bed risers under your bedframe.  Medicines  If needed, medicines can help relieve the symptoms of GERD and prevent damage to the esophagus. Discuss a medicine plan with your healthcare provider. This may include one or more of the following medicines:    Antacids to help neutralize the normal acids in your stomach.    Acid blockers (Histamine or H2 blockers) to decrease acid production.    Acid inhibitors (proton pump inhibitors PPIs) to decrease acid production in a different way than the blockers. They may work better, but can take a little longer to take effect.  Take an antacid 30 to 60 minutes after eating and at bedtime, but not at the same time as an acid blocker.  Try not to take medicines such as ibuprofen and aspirin. If you are taking aspirin for your heart or other medical reasons, talk to your healthcare provider about stopping it.  Follow-up care  Follow up with your healthcare provider or as advised by our staff.  When to seek medical advice  Call your healthcare provider if any of the following occur:    Stomach pain gets worse or moves to the lower right abdomen (appendix area)    Chest pain appears or gets worse, or spreads to the back, neck, shoulder, or arm    An over-the-counter trial of medicine doesn't relieve your symptoms    Weight loss that can't be explained    Trouble or pain swallowing    Frequent vomiting (can t keep down liquids)    Blood in the stool or vomit (red or black in color)    Feeling weak or dizzy    Fever of 100.4 F (38 C) or higher, or as directed by your healthcare " provider  Date Last Reviewed: 3/1/2018    4144-6794 [employer]. [address] [place] [address], [place] [address]. All rights reserved. This information is not intended as a substitute for professional medical care. Always follow your healthcare professional's instructions.           Patient Education     Middle Ear Infection (Adult)  You have an infection of the middle ear, the space behind the eardrum. This is also called acute otitis media (AOM). Sometimes it is caused by the common cold. This is because congestion can block the internal passage (eustachian tube) that drains fluid from the middle ear. When the middle ear fills with fluid, bacteria can grow there and cause an infection. Oral antibiotics are used to treat this illness, not ear drops. Symptoms usually start to improve within 1 to 2 days of treatment.    Home care  The following are general care guidelines:    Finish all of the antibiotic medicine given, even though you may feel better after the first few days.    You may use over-the-counter medicine, such as acetaminophen or ibuprofen, to control pain and fever, unless something else was prescribed. If you have chronic liver or kidney disease or have ever had a stomach ulcer or gastrointestinal bleeding, talk with your healthcare provider before using these medicines. Do not give aspirin to anyone under 18 years of age who has a fever. It may cause severe illness or death.  Follow-up care  Follow up with your healthcare provider, or as advised, in 2 weeks if all symptoms have not gotten better, or if hearing doesn't go back to normal within 1 month.  When to seek medical advice  Call your healthcare provider right away if any of these occur:    Ear pain gets worse or does not improve after 3 days of treatment    Unusual drowsiness or confusion    Neck pain, stiff neck, or headache    Fluid or blood draining from the ear canal    Fever of 100.4 F (38 C) or as advised     Seizure  Date Last  Reviewed: 6/1/2016 2000-2019 [employer]. [address] [place] [address], [place] [address]. All rights reserved. This information is not intended as a substitute for professional medical care. Always follow your healthcare professional's instructions.

## 2021-01-15 ENCOUNTER — OFFICE VISIT (OUTPATIENT)
Dept: FAMILY MEDICINE | Facility: OTHER | Age: 26
End: 2021-01-15
Attending: NURSE PRACTITIONER
Payer: COMMERCIAL

## 2021-01-15 VITALS
BODY MASS INDEX: 47.74 KG/M2 | HEART RATE: 94 BPM | WEIGHT: 315 LBS | DIASTOLIC BLOOD PRESSURE: 76 MMHG | HEIGHT: 68 IN | RESPIRATION RATE: 20 BRPM | OXYGEN SATURATION: 97 % | TEMPERATURE: 97.1 F | SYSTOLIC BLOOD PRESSURE: 132 MMHG

## 2021-01-15 DIAGNOSIS — H66.001 RIGHT ACUTE SUPPURATIVE OTITIS MEDIA: Primary | ICD-10-CM

## 2021-01-15 DIAGNOSIS — H92.01 ACUTE PAIN OF RIGHT EAR: ICD-10-CM

## 2021-01-15 PROCEDURE — G0463 HOSPITAL OUTPT CLINIC VISIT: HCPCS

## 2021-01-15 PROCEDURE — 99213 OFFICE O/P EST LOW 20 MIN: CPT | Performed by: NURSE PRACTITIONER

## 2021-01-15 RX ORDER — CEFDINIR 300 MG/1
300 CAPSULE ORAL 2 TIMES DAILY
Qty: 14 CAPSULE | Refills: 0 | Status: SHIPPED | OUTPATIENT
Start: 2021-01-15 | End: 2021-01-22

## 2021-01-15 ASSESSMENT — PAIN SCALES - GENERAL: PAINLEVEL: SEVERE PAIN (7)

## 2021-01-15 ASSESSMENT — MIFFLIN-ST. JEOR: SCORE: 2451.82

## 2021-01-15 NOTE — PROGRESS NOTES
"HPI:    Nathan Wells is a 25 year old male  who presents to [facility] today for right ear pain.    States right ear with pain, plugged feeling and pressure for the past 3 to 4 days.  Feels like his right ear is going to pop when he yawns or burps.  No runny or stuffy nose.  No fevers or chills.  No sore throat.  No jaw pain.  No cough.  States hx of frequent ear infections and hx of ear tubes x 4.  Taking ibuprofen.    Past Medical History:   Diagnosis Date     Constipation     3/23/05,started on MiraLax and behavioral modification treatments     Encounter for adjustment and management of other implanted devices     3/23/05,T tube is out     Otitis media     History of recurrent ear infections     Past Surgical History:   Procedure Laterality Date     ADENOIDECTOMY      No Comments Provided     MYRINGOTOMY, INSERT TUBE, COMBINED      12/27/05,Vent tubes   4 times.     Social History     Tobacco Use     Smoking status: Never Smoker     Smokeless tobacco: Never Used   Substance Use Topics     Alcohol use: No     Current Outpatient Medications   Medication Sig Dispense Refill     loratadine (CLARITIN) 10 MG tablet Take 1 tablet (10 mg) by mouth daily (Patient not taking: Reported on 1/15/2021) 90 tablet 0     Allergies   Allergen Reactions     Erythromycin-Sulfisoxazole Unknown     Pediatric Multivitamins-Iron Unknown     Sulfa Drugs Unknown     Allergies to pediazole     Amoxicillin Rash         Past medical history, past surgical history, current medications and allergies reviewed and accurate to the best of my knowledge.        ROS:  Refer to HPI    /76   Pulse 94   Temp 97.1  F (36.2  C) (Tympanic)   Resp 20   Ht 1.727 m (5' 8\")   Wt 149.7 kg (330 lb 1.6 oz)   SpO2 97%   BMI 50.19 kg/m      EXAM:  General Appearance: Well appearing adult male, appropriate appearance for age. No acute distress  Ears: Left TM intact, no visible bony landmarks appreciated, moderate erythema, purulent effusion " with moderate/significant bulging, no drainage.  Right TM intact, translucent with bony landmarks appreciated, no erythema, no effusion, no bulging, no purulence.  Left auditory canal clear.  Right auditory canal clear.  Normal external ears, non tender.  Orophayrnx: voice clear.    Nose:  No noted drainage or congestion   Neck: supple without adenopathy  Respiratory: normal chest wall and respirations.  Normal effort.  No cough appreciated.  Musculoskeletal:  Equal movement of bilateral upper extremities.  Equal movement of bilateral lower extremities.  Normal gait.    Psychological: normal affect, alert, oriented, and pleasant.           ASSESSMENT/PLAN:    I have reviewed the nursing notes.  I have reviewed the findings, diagnosis, plan and need for follow up with the patient.    1. Acute pain of right ear    May use over-the-counter Tylenol or ibuprofen PRN    2. Right acute suppurative otitis media    - cefdinir (OMNICEF) 300 MG capsule; Take 1 capsule (300 mg) by mouth 2 times daily for 7 days  Dispense: 14 capsule; Refill: 0    Discussed warning signs/symptoms indicative of need to f/u  Follow up if symptoms persist or worsen or concerns      I explained my diagnostic considerations and recommendations to the patient, who voiced understanding and agreement with the treatment plan. All questions were answered. We discussed potential side effects of any prescribed or recommended therapies, as well as expectations for response to treatments.    Disclaimer:  This note consists of words and symbols derived from keyboarding, dictation, or using voice recognition software. As a result, there may be errors in the script that have gone undetected. Please consider this when interpreting information found in this note.

## 2021-01-15 NOTE — NURSING NOTE
"Chief Complaint   Patient presents with     Ear Problem     Patient is here for pain in the right ear that started 3-4 days ago. Patient has tried Ibuprofen with some relief.     Initial /76   Pulse 94   Temp 97.1  F (36.2  C) (Tympanic)   Resp 20   Ht 1.727 m (5' 8\")   Wt 149.7 kg (330 lb 1.6 oz)   SpO2 97%   BMI 50.19 kg/m   Estimated body mass index is 50.19 kg/m  as calculated from the following:    Height as of this encounter: 1.727 m (5' 8\").    Weight as of this encounter: 149.7 kg (330 lb 1.6 oz).  Medication Reconciliation: complete    Jerilyn Ames LPN  "

## 2021-03-25 ENCOUNTER — PATIENT OUTREACH (OUTPATIENT)
Dept: FAMILY MEDICINE | Facility: OTHER | Age: 26
End: 2021-03-25

## 2021-03-25 NOTE — LETTER
March 25, 2021      Nathan Wells  [address]  [facility] [address]      Your healthcare team cares about your health. To provide you with the best care,   we have reviewed your chart and based on our findings, we see that you are due to:     - DEPRESSION FOLLOW UP: Complete the attached questionnaires to ensure your mental health needs are being properly met.  Please fill them out and send back to us via [employer], and feel free to call us with any questions or concerns at [phone].    - OTHER FOLLOW UP:  Office Visit for yearly physical exam, medication review, labs, and immunizations.      If you have already completed these items, please contact the clinic via phone or   [facility] so your care team can review and update your records. Thank you for   choosing [facility] for your healthcare needs. For any questions,   concerns, or to schedule an appointment please contact the clinic.       Healthy Regards,      Your [facility] Care Team        Enclosure:  PHQ-9

## 2021-03-25 NOTE — TELEPHONE ENCOUNTER
Patient Quality Outreach      Summary:    Patient has the following on his problem list/HM: physical, labs, immunizations    Patient is due/failing the following:   Adult/Adolescent physical, date due: 2021 and Immunizations    Type of outreach:    Sent letter.    Questions for provider review:    None                                                                                                                                     Sarika Bowden NP on 3/25/2021 at 1:20 PM       Chart routed to N/A.

## 2021-06-10 DIAGNOSIS — J30.2 SEASONAL ALLERGIC RHINITIS, UNSPECIFIED TRIGGER: ICD-10-CM

## 2021-06-10 RX ORDER — LORATADINE 10 MG/1
TABLET ORAL
Qty: 34 TABLET | Refills: 2 | OUTPATIENT
Start: 2021-06-10

## 2022-01-22 ENCOUNTER — APPOINTMENT (OUTPATIENT)
Dept: GENERAL RADIOLOGY | Facility: OTHER | Age: 27
End: 2022-01-22
Attending: PHYSICIAN ASSISTANT
Payer: COMMERCIAL

## 2022-01-22 ENCOUNTER — HOSPITAL ENCOUNTER (EMERGENCY)
Facility: OTHER | Age: 27
Discharge: HOME | End: 2022-01-22
Attending: PHYSICIAN ASSISTANT | Admitting: PHYSICIAN ASSISTANT
Payer: COMMERCIAL

## 2022-01-22 VITALS
SYSTOLIC BLOOD PRESSURE: 102 MMHG | WEIGHT: 315 LBS | BODY MASS INDEX: 47.74 KG/M2 | RESPIRATION RATE: 12 BRPM | HEIGHT: 68 IN | HEART RATE: 114 BPM | OXYGEN SATURATION: 94 % | TEMPERATURE: 102.5 F | DIASTOLIC BLOOD PRESSURE: 36 MMHG

## 2022-01-22 DIAGNOSIS — J10.1 INFLUENZA A: ICD-10-CM

## 2022-01-22 LAB
ALBUMIN SERPL-MCNC: 4.2 G/DL (ref 3.5–5.7)
ALP SERPL-CCNC: 102 U/L (ref 34–104)
ALT SERPL W P-5'-P-CCNC: 55 U/L (ref 7–52)
ANION GAP SERPL CALCULATED.3IONS-SCNC: 8 MMOL/L (ref 3–14)
AST SERPL W P-5'-P-CCNC: 44 U/L (ref 13–39)
BASOPHILS # BLD AUTO: 0.1 10E3/UL (ref 0–0.2)
BASOPHILS NFR BLD AUTO: 1 %
BILIRUB SERPL-MCNC: 0.6 MG/DL (ref 0.3–1)
BUN SERPL-MCNC: 8 MG/DL (ref 7–25)
CALCIUM SERPL-MCNC: 10.3 MG/DL (ref 8.6–10.3)
CHLORIDE BLD-SCNC: 101 MMOL/L (ref 98–107)
CO2 SERPL-SCNC: 24 MMOL/L (ref 21–31)
CREAT SERPL-MCNC: 1.14 MG/DL (ref 0.7–1.3)
EOSINOPHIL # BLD AUTO: 0.1 10E3/UL (ref 0–0.7)
EOSINOPHIL NFR BLD AUTO: 1 %
ERYTHROCYTE [DISTWIDTH] IN BLOOD BY AUTOMATED COUNT: 13.5 % (ref 10–15)
FLUAV RNA SPEC QL NAA+PROBE: POSITIVE
FLUBV RNA RESP QL NAA+PROBE: NEGATIVE
GFR SERPL CREATININE-BSD FRML MDRD: 90 ML/MIN/1.73M2
GLUCOSE BLD-MCNC: 125 MG/DL (ref 70–105)
HCT VFR BLD AUTO: 41.5 % (ref 40–53)
HGB BLD-MCNC: 14.7 G/DL (ref 13.3–17.7)
IMM GRANULOCYTES # BLD: 0 10E3/UL
IMM GRANULOCYTES NFR BLD: 1 %
LACTATE SERPL-SCNC: 1.1 MMOL/L (ref 0.7–2)
LYMPHOCYTES # BLD AUTO: 0.5 10E3/UL (ref 0.8–5.3)
LYMPHOCYTES NFR BLD AUTO: 6 %
MCH RBC QN AUTO: 28.5 PG (ref 26.5–33)
MCHC RBC AUTO-ENTMCNC: 35.4 G/DL (ref 31.5–36.5)
MCV RBC AUTO: 80 FL (ref 78–100)
MONOCYTES # BLD AUTO: 1 10E3/UL (ref 0–1.3)
MONOCYTES NFR BLD AUTO: 13 %
NEUTROPHILS # BLD AUTO: 6.4 10E3/UL (ref 1.6–8.3)
NEUTROPHILS NFR BLD AUTO: 78 %
NRBC # BLD AUTO: 0 10E3/UL
NRBC BLD AUTO-RTO: 0 /100
NT-PROBNP SERPL-MCNC: 28 PG/ML (ref 0–100)
PLATELET # BLD AUTO: 232 10E3/UL (ref 150–450)
POTASSIUM BLD-SCNC: 4 MMOL/L (ref 3.5–5.1)
PROT SERPL-MCNC: 7.4 G/DL (ref 6.4–8.9)
RBC # BLD AUTO: 5.16 10E6/UL (ref 4.4–5.9)
RSV RNA SPEC NAA+PROBE: NEGATIVE
SARS-COV-2 RNA RESP QL NAA+PROBE: NEGATIVE
SODIUM SERPL-SCNC: 133 MMOL/L (ref 134–144)
TROPONIN I SERPL-MCNC: 3.1 PG/ML (ref 0–34)
WBC # BLD AUTO: 8 10E3/UL (ref 4–11)

## 2022-01-22 PROCEDURE — 87637 SARSCOV2&INF A&B&RSV AMP PRB: CPT | Performed by: PHYSICIAN ASSISTANT

## 2022-01-22 PROCEDURE — 250N000011 HC RX IP 250 OP 636: Performed by: FAMILY MEDICINE

## 2022-01-22 PROCEDURE — 36415 COLL VENOUS BLD VENIPUNCTURE: CPT | Performed by: PHYSICIAN ASSISTANT

## 2022-01-22 PROCEDURE — 83880 ASSAY OF NATRIURETIC PEPTIDE: CPT | Performed by: PHYSICIAN ASSISTANT

## 2022-01-22 PROCEDURE — 85025 COMPLETE CBC W/AUTO DIFF WBC: CPT | Performed by: PHYSICIAN ASSISTANT

## 2022-01-22 PROCEDURE — 250N000013 HC RX MED GY IP 250 OP 250 PS 637: Performed by: PHYSICIAN ASSISTANT

## 2022-01-22 PROCEDURE — 80053 COMPREHEN METABOLIC PANEL: CPT | Performed by: PHYSICIAN ASSISTANT

## 2022-01-22 PROCEDURE — 99284 EMERGENCY DEPT VISIT MOD MDM: CPT | Mod: 25 | Performed by: PHYSICIAN ASSISTANT

## 2022-01-22 PROCEDURE — 84484 ASSAY OF TROPONIN QUANT: CPT | Performed by: PHYSICIAN ASSISTANT

## 2022-01-22 PROCEDURE — C9803 HOPD COVID-19 SPEC COLLECT: HCPCS | Performed by: PHYSICIAN ASSISTANT

## 2022-01-22 PROCEDURE — 83605 ASSAY OF LACTIC ACID: CPT | Performed by: PHYSICIAN ASSISTANT

## 2022-01-22 PROCEDURE — 250N000011 HC RX IP 250 OP 636: Performed by: PHYSICIAN ASSISTANT

## 2022-01-22 PROCEDURE — 99284 EMERGENCY DEPT VISIT MOD MDM: CPT | Performed by: PHYSICIAN ASSISTANT

## 2022-01-22 PROCEDURE — 96375 TX/PRO/DX INJ NEW DRUG ADDON: CPT | Performed by: PHYSICIAN ASSISTANT

## 2022-01-22 PROCEDURE — 258N000003 HC RX IP 258 OP 636: Performed by: PHYSICIAN ASSISTANT

## 2022-01-22 PROCEDURE — 87040 BLOOD CULTURE FOR BACTERIA: CPT | Mod: 91 | Performed by: PHYSICIAN ASSISTANT

## 2022-01-22 PROCEDURE — 96374 THER/PROPH/DIAG INJ IV PUSH: CPT | Mod: XU | Performed by: PHYSICIAN ASSISTANT

## 2022-01-22 PROCEDURE — 71045 X-RAY EXAM CHEST 1 VIEW: CPT | Mod: TC

## 2022-01-22 PROCEDURE — 96360 HYDRATION IV INFUSION INIT: CPT | Performed by: PHYSICIAN ASSISTANT

## 2022-01-22 RX ORDER — DEXAMETHASONE SODIUM PHOSPHATE 10 MG/ML
6 INJECTION, SOLUTION INTRAMUSCULAR; INTRAVENOUS ONCE
Status: COMPLETED | OUTPATIENT
Start: 2022-01-22 | End: 2022-01-22

## 2022-01-22 RX ORDER — CODEINE PHOSPHATE AND GUAIFENESIN 10; 100 MG/5ML; MG/5ML
5 SOLUTION ORAL EVERY 4 HOURS PRN
Status: DISCONTINUED | OUTPATIENT
Start: 2022-01-22 | End: 2022-01-23 | Stop reason: HOSPADM

## 2022-01-22 RX ORDER — KETOROLAC TROMETHAMINE 15 MG/ML
15 INJECTION, SOLUTION INTRAMUSCULAR; INTRAVENOUS ONCE
Status: COMPLETED | OUTPATIENT
Start: 2022-01-22 | End: 2022-01-22

## 2022-01-22 RX ORDER — OSELTAMIVIR PHOSPHATE 75 MG/1
75 CAPSULE ORAL ONCE
Status: DISCONTINUED | OUTPATIENT
Start: 2022-01-22 | End: 2022-01-23 | Stop reason: HOSPADM

## 2022-01-22 RX ORDER — OSELTAMIVIR PHOSPHATE 75 MG/1
75 CAPSULE ORAL 2 TIMES DAILY
Qty: 10 CAPSULE | Refills: 0 | Status: SHIPPED | OUTPATIENT
Start: 2022-01-22 | End: 2022-01-27

## 2022-01-22 RX ORDER — IBUPROFEN 800 MG/1
800 TABLET, FILM COATED ORAL EVERY 8 HOURS PRN
Qty: 24 TABLET | Refills: 0 | Status: SHIPPED | OUTPATIENT
Start: 2022-01-22 | End: 2022-01-30

## 2022-01-22 RX ORDER — ONDANSETRON 2 MG/ML
8 INJECTION INTRAMUSCULAR; INTRAVENOUS ONCE
Status: COMPLETED | OUTPATIENT
Start: 2022-01-22 | End: 2022-01-22

## 2022-01-22 RX ORDER — ONDANSETRON 4 MG/1
8 TABLET, ORALLY DISINTEGRATING ORAL EVERY 8 HOURS PRN
Qty: 10 TABLET | Refills: 0 | Status: SHIPPED | OUTPATIENT
Start: 2022-01-22 | End: 2022-01-25

## 2022-01-22 RX ORDER — ALBUTEROL SULFATE 90 UG/1
2 AEROSOL, METERED RESPIRATORY (INHALATION) ONCE
Status: COMPLETED | OUTPATIENT
Start: 2022-01-22 | End: 2022-01-22

## 2022-01-22 RX ORDER — SODIUM CHLORIDE 9 MG/ML
INJECTION, SOLUTION INTRAVENOUS CONTINUOUS
Status: DISCONTINUED | OUTPATIENT
Start: 2022-01-22 | End: 2022-01-23 | Stop reason: HOSPADM

## 2022-01-22 RX ADMIN — ONDANSETRON 8 MG: 2 INJECTION INTRAMUSCULAR; INTRAVENOUS at 23:00

## 2022-01-22 RX ADMIN — SODIUM CHLORIDE 1000 ML: 9 INJECTION, SOLUTION INTRAVENOUS at 22:16

## 2022-01-22 RX ADMIN — DEXAMETHASONE SODIUM PHOSPHATE 6 MG: 10 INJECTION, SOLUTION INTRAMUSCULAR; INTRAVENOUS at 22:33

## 2022-01-22 RX ADMIN — ALBUTEROL SULFATE 2 PUFF: 90 AEROSOL, METERED RESPIRATORY (INHALATION) at 22:35

## 2022-01-22 RX ADMIN — KETOROLAC TROMETHAMINE 15 MG: 15 INJECTION, SOLUTION INTRAMUSCULAR; INTRAVENOUS at 23:02

## 2022-01-22 ASSESSMENT — ENCOUNTER SYMPTOMS
APPETITE CHANGE: 1
NAUSEA: 1
LIGHT-HEADEDNESS: 0
SEIZURES: 0
FLANK PAIN: 0
EYE PAIN: 0
AGITATION: 0
ACTIVITY CHANGE: 1
FEVER: 1
SHORTNESS OF BREATH: 1
FATIGUE: 1
CONFUSION: 0
TREMORS: 0
STRIDOR: 0
FACIAL ASYMMETRY: 0
DIZZINESS: 0
FACIAL SWELLING: 0
VOMITING: 1
COUGH: 1
BACK PAIN: 0
ABDOMINAL PAIN: 0

## 2022-01-22 ASSESSMENT — MIFFLIN-ST. JEOR: SCORE: 2537.09

## 2022-01-22 NOTE — Clinical Note
Nathan Wells was seen and treated in our emergency department on 1/22/2022.  He may return to work on 01/27/2022.       If you have any questions or concerns, please don't hesitate to call.      Pedro Leonard PA-C

## 2022-01-23 NOTE — ED TRIAGE NOTES
"ED Nursing Triage Note (General)   ________________________________    Nathan AKIRA Wells is a 26 year old Male that presents to triage private car  With history of  N/V for the past 24 hours.  States that last 6 hourrs has  been dry heaving.  Patient is also c/o feeling faint.   gBP (!) 83/56   Pulse (!) 122   Temp (!) 101.4  F (38.6  C)   Resp 24   Ht 1.727 m (5' 8\")   Wt (!) 158.8 kg (350 lb)   SpO2 95%   BMI 53.22 kg/m  t  Patient appears alert  and oriented, in moderate distress., and cooperative and pleasant behavior.    GCS Eye Opening = 4=Spontaneous  Airway: intact  Breathing noted as Normal.  Circulation Normal  Skin normal  Action taken:  Triage to critical care immediately      PRE HOSPITAL PRIOR LIVING SITUATION Significant Other  "

## 2022-01-23 NOTE — ED PROVIDER NOTES
"  History     Chief Complaint   Patient presents with     Nausea & Vomiting     Fatigue     HPI  Nathan Wells is a 26 year old male who reports that his symptoms started yesterday.  Over the past 6 hours he has had increasing nausea and \"dry heaving\".  He reports that a coworker tested positive for COVID today.  He has fever and feels very sick.  He is noted to have a temp of 101.4, blood pressure is decreased to 83/56, and his pulse was 122.  His SaO2 is 95% on room air with 24 for respirations.    Allergies:  Allergies   Allergen Reactions     Erythromycin-Sulfisoxazole Unknown     Pediatric Multivitamins-Iron Unknown     Sulfa Drugs Unknown     Allergies to pediazole     Amoxicillin Rash       Problem List:    Patient Active Problem List    Diagnosis Date Noted     Allergic rhinitis due to pollen 02/02/2018     Priority: Medium     Overweight 02/02/2018     Priority: Medium     Overview:   Overweight, with BMI of 28       Ankle pain 06/26/2013     Priority: Medium        Past Medical History:    Past Medical History:   Diagnosis Date     Constipation      Encounter for adjustment and management of other implanted devices      Otitis media        Past Surgical History:    Past Surgical History:   Procedure Laterality Date     ADENOIDECTOMY      No Comments Provided     MYRINGOTOMY, INSERT TUBE, COMBINED      12/27/05,Vent tubes   4 times.       Family History:    Family History   Problem Relation Age of Onset     Family History Negative Mother         Good Health     Cancer Father         Cancer,brain cancer     Heart Disease Other         Heart Disease       Social History:  Marital Status:  Single [1]  Social History     Tobacco Use     Smoking status: Never Smoker     Smokeless tobacco: Never Used   Substance Use Topics     Alcohol use: No     Drug use: Never     Comment: Drug use: No        Medications:    ibuprofen (ADVIL/MOTRIN) 800 MG tablet  ondansetron (ZOFRAN ODT) 4 MG ODT tab  loratadine " "(CLARITIN) 10 MG tablet          Review of Systems   Constitutional: Positive for activity change, appetite change, fatigue and fever.   HENT: Negative for drooling and facial swelling.    Eyes: Negative for pain and visual disturbance.   Respiratory: Positive for cough and shortness of breath. Negative for stridor.    Cardiovascular: Negative for chest pain.   Gastrointestinal: Positive for nausea and vomiting. Negative for abdominal pain.   Genitourinary: Negative for flank pain.   Musculoskeletal: Negative for back pain.   Skin: Negative for pallor.   Neurological: Negative for dizziness, tremors, seizures, facial asymmetry and light-headedness.   Psychiatric/Behavioral: Negative for agitation and confusion.   All other systems reviewed and are negative.      Physical Exam   BP: (!) 83/56  Pulse: (!) 122  Temp: (!) 101.4  F (38.6  C)  Resp: 24  Height: 172.7 cm (5' 8\")  Weight: (!) 158.8 kg (350 lb)  SpO2: 95 %      Physical Exam  Vitals and nursing note reviewed.   Constitutional:       General: He is not in acute distress.     Appearance: Normal appearance. He is ill-appearing. He is not toxic-appearing.   HENT:      Head: Normocephalic. No raccoon eyes, right periorbital erythema or left periorbital erythema.      Right Ear: No drainage or tenderness.      Left Ear: No drainage or tenderness.      Nose: Nose normal.   Eyes:      General: Lids are normal. Gaze aligned appropriately. No scleral icterus.     Extraocular Movements: Extraocular movements intact.   Neck:      Trachea: No tracheal deviation.   Cardiovascular:      Rate and Rhythm: Normal rate.   Pulmonary:      Effort: Pulmonary effort is normal. No respiratory distress.      Breath sounds: No stridor. No wheezing.      Comments: Lung sounds are clear but decreased.  SaO2 is 95% on room air.  He appears in no real respiratory distress.  Minimal tachypnea at times.  Talks in full sentences.  Abdominal:      Tenderness: There is no abdominal " tenderness.   Musculoskeletal:         General: No deformity or signs of injury. Normal range of motion.      Cervical back: Normal range of motion. No signs of trauma.   Skin:     General: Skin is warm and dry.      Coloration: Skin is not jaundiced or pale.   Neurological:      General: No focal deficit present.      Mental Status: He is alert and oriented to person, place, and time.      GCS: GCS eye subscore is 4. GCS verbal subscore is 5. GCS motor subscore is 6.      Motor: No tremor or seizure activity.   Psychiatric:         Attention and Perception: Attention normal.         Mood and Affect: Mood normal.         ED Course     Results for orders placed or performed during the hospital encounter of 01/22/22 (from the past 24 hour(s))   Symptomatic; Yes; 1/21/2022 Influenza A/B & SARS-CoV2 (COVID-19) Virus PCR Multiplex Nose    Specimen: Nose; Swab   Result Value Ref Range    Influenza A PCR Positive (A) Negative    Influenza B PCR Negative Negative    RSV PCR Negative Negative    SARS CoV2 PCR Negative Negative    Narrative    Testing was performed using the Xpert Xpress CoV2/Flu/RSV Assay on the Cepheid GeneXpert Instrument. This test should be ordered for the detection of SARS-CoV-2 and influenza viruses in individuals who meet clinical and/or epidemiological criteria. Test performance is unknown in asymptomatic patients. This test is for in vitro diagnostic use under the FDA EUA for laboratories certified under CLIA to perform high or moderate complexity testing. This test has not been FDA cleared or approved. A negative result does not rule out the presence of PCR inhibitors in the specimen or target RNA in concentration below the limit of detection for the assay. If only one viral target is positive but coinfection with multiple targets is suspected, the sample should be re-tested with another FDA cleared, approved, or authorized test, if coinfection would change clinical management. This test was  validated by the [facility]. These laboratories are certified under the Clinical  Laboratory Improvement Amendments of 1988 (CLIA-88) as qualified to perform high complexity laboratory testing.   Lactic acid whole blood   Result Value Ref Range    Lactic Acid 1.1 0.7 - 2.0 mmol/L   CBC with platelets differential    Narrative    The following orders were created for panel order CBC with platelets differential.  Procedure                               Abnormality         Status                     ---------                               -----------         ------                     CBC with platelets and d...[[id number]]  Abnormal            Final result                 Please view results for these tests on the individual orders.   Comprehensive metabolic panel   Result Value Ref Range    Sodium 133 (L) 134 - 144 mmol/L    Potassium 4.0 3.5 - 5.1 mmol/L    Chloride 101 98 - 107 mmol/L    Carbon Dioxide (CO2) 24 21 - 31 mmol/L    Anion Gap 8 3 - 14 mmol/L    Urea Nitrogen 8 7 - 25 mg/dL    Creatinine 1.14 0.70 - 1.30 mg/dL    Calcium 10.3 8.6 - 10.3 mg/dL    Glucose 125 (H) 70 - 105 mg/dL    Alkaline Phosphatase 102 34 - 104 U/L    AST 44 (H) 13 - 39 U/L    ALT 55 (H) 7 - 52 U/L    Protein Total 7.4 6.4 - 8.9 g/dL    Albumin 4.2 3.5 - 5.7 g/dL    Bilirubin Total 0.6 0.3 - 1.0 mg/dL    GFR Estimate 90 >60 mL/min/1.73m2   Nt probnp inpatient (BNP)   Result Value Ref Range    N terminal Pro BNP Inpatient 28 0 - 100 pg/mL   Troponin I   Result Value Ref Range    Troponin I 3.1 0.0 - 34.0 pg/mL   CBC with platelets and differential   Result Value Ref Range    WBC Count 8.0 4.0 - 11.0 10e3/uL    RBC Count 5.16 4.40 - 5.90 10e6/uL    Hemoglobin 14.7 13.3 - 17.7 g/dL    Hematocrit 41.5 40.0 - 53.0 %    MCV 80 78 - 100 fL    MCH 28.5 26.5 - 33.0 pg    MCHC 35.4 31.5 - 36.5 g/dL    RDW 13.5 10.0 - 15.0 %    Platelet Count 232 150 - 450 10e3/uL    % Neutrophils 78 %    % Lymphocytes 6 %    % Monocytes 13 %     % Eosinophils 1 %    % Basophils 1 %    % Immature Granulocytes 1 %    NRBCs per 100 WBC 0 <1 /100    Absolute Neutrophils 6.4 1.6 - 8.3 10e3/uL    Absolute Lymphocytes 0.5 (L) 0.8 - 5.3 10e3/uL    Absolute Monocytes 1.0 0.0 - 1.3 10e3/uL    Absolute Eosinophils 0.1 0.0 - 0.7 10e3/uL    Absolute Basophils 0.1 0.0 - 0.2 10e3/uL    Absolute Immature Granulocytes 0.0 <=0.4 10e3/uL    Absolute NRBCs 0.0 10e3/uL   XR Chest Port 1 View    Narrative    PROCEDURE INFORMATION:   Exam: XR Chest   Exam date and time: 1/22/2022 10:50 PM   Age: 26 years old   Clinical indication: Other: Possible covid, SOB     TECHNIQUE:   Imaging protocol: XR of the chest.   Views: 1 view.     COMPARISON:   No relevant prior studies available.     FINDINGS:   Tubes, catheters and devices: EKG leads overlying the chest.     Lungs: Unremarkable. No consolidation.   Pleural spaces: Unremarkable. No pleural effusion. No pneumothorax.   Heart/Mediastinum: Unremarkable. No cardiomegaly.   Bones/joints: Unremarkable.       Impression    IMPRESSION:   No radiographic evidence of acute pulmonary process.    THIS DOCUMENT HAS BEEN ELECTRONICALLY SIGNED BY HECTOR ERICKSON DO       Medications   0.9% sodium chloride BOLUS (1,000 mLs Intravenous New Bag 1/22/22 2216)     Followed by   0.9% sodium chloride BOLUS (has no administration in time range)     Followed by   sodium chloride 0.9% infusion (has no administration in time range)   guaiFENesin-codeine (ROBITUSSIN AC) 100-10 MG/5ML solution 5 mL (has no administration in time range)   albuterol (PROVENTIL HFA/VENTOLIN HFA) inhaler (2 puffs Inhalation Given 1/22/22 2235)   dexamethasone PF (DECADRON) injection 6 mg (6 mg Intravenous Given 1/22/22 2233)   ondansetron (ZOFRAN) injection 8 mg (8 mg Intravenous Given 1/22/22 2300)   ketorolac (TORADOL) injection 15 mg (15 mg Intravenous Given 1/22/22 2302)       Assessments & Plan (with Medical Decision Making)     I have reviewed the nursing  notes.    Febrile temp is 101.4.  Vital signs show tachycardia with heart rate at 122 and a decreased blood pressure at 83/56.  Initial orders placed however my shift is over patient care will be turned over to Dr. Freedman at this time.    2300 -the patient is checked out to me by CLAIRE Talamantes at the routine change of shift with labs pending.    Labs reviewed as above.  Findings are consistent with influenza A.  The patient received medications as above and is feeling better.  His blood pressure has improved to 107/79.  The patient is discharged home in good condition. Follow-up with primary care physician as needed. Continue ibuprofen and Tylenol as needed. Rest, fluids. Note for work provided. Prescription for Zofran provided.  I have reviewed the findings, diagnosis, plan and need for follow up with the patient.    New Prescriptions    IBUPROFEN (ADVIL/MOTRIN) 800 MG TABLET    Take 1 tablet (800 mg) by mouth every 8 hours as needed for moderate pain    ONDANSETRON (ZOFRAN ODT) 4 MG ODT TAB    Take 2 tablets (8 mg) by mouth every 8 hours as needed for nausea       Final diagnoses:   Influenza A     1/22/2022   [facility]     Pedro Leonard PA-C  01/22/22 [id number]       Dc Freedman MD  01/22/22 [id number]

## 2022-01-28 LAB
BACTERIA BLD CULT: NO GROWTH
BACTERIA BLD CULT: NO GROWTH

## 2022-05-17 ENCOUNTER — OFFICE VISIT (OUTPATIENT)
Dept: FAMILY MEDICINE | Facility: OTHER | Age: 27
End: 2022-05-17
Attending: FAMILY MEDICINE
Payer: COMMERCIAL

## 2022-05-17 VITALS
SYSTOLIC BLOOD PRESSURE: 131 MMHG | OXYGEN SATURATION: 97 % | WEIGHT: 310.4 LBS | BODY MASS INDEX: 47.2 KG/M2 | TEMPERATURE: 98 F | DIASTOLIC BLOOD PRESSURE: 86 MMHG | RESPIRATION RATE: 20 BRPM | HEART RATE: 94 BPM

## 2022-05-17 DIAGNOSIS — H66.001 RIGHT ACUTE SUPPURATIVE OTITIS MEDIA: Primary | ICD-10-CM

## 2022-05-17 PROCEDURE — 99213 OFFICE O/P EST LOW 20 MIN: CPT | Performed by: PHYSICIAN ASSISTANT

## 2022-05-17 PROCEDURE — G0463 HOSPITAL OUTPT CLINIC VISIT: HCPCS | Performed by: PHYSICIAN ASSISTANT

## 2022-05-17 RX ORDER — AZITHROMYCIN 250 MG/1
TABLET, FILM COATED ORAL
Qty: 6 TABLET | Refills: 0 | Status: SHIPPED | OUTPATIENT
Start: 2022-05-17 | End: 2022-05-22

## 2022-05-17 ASSESSMENT — PAIN SCALES - GENERAL: PAINLEVEL: SEVERE PAIN (7)

## 2022-05-17 NOTE — PROGRESS NOTES
ASSESSMENT/PLAN:    I have reviewed the nursing notes.  I have reviewed the findings, diagnosis, plan and need for follow up with the patient.    1. Right acute suppurative otitis media  - azithromycin (ZITHROMAX) 250 MG tablet; Take 2 tablets (500 mg) by mouth daily for 1 day, THEN 1 tablet (250 mg) daily for 4 days.  Dispense: 6 tablet; Refill: 0  - Vital signs stable. PE consistent with OME/ear infection of right ear(s). Treatment of choice includes antibiotic regimen (Azithromycin, alternating tylenol and ibuprofen every 4-6 hours as needed (if able, daily limits reviewed in AVS and verbally with patient), warm compresses, other symptomatic remedies. Avoid trauma to ear(s) such as Q-tips. If symptoms change or worsen, recommend follow up for reevaluation (high fevers, worsening pain, abnormal drainage or odor from ear, etc.). Discussed if ear infections become increasingly common, as this point, a referral to ENT can be made, typically by PCP. Patient is in agreement and understanding of the above treatment plan. All questions and concerns were addressed and answered to patient's satisfaction. AVS reviewed with patient.     Discussed warning signs/symptoms indicative of need to f/u    Follow up if symptoms persist or worsen or concerns    I explained my diagnostic considerations and recommendations to the patient, who voiced understanding and agreement with the treatment plan. All questions were answered. We discussed potential side effects of any prescribed or recommended therapies, as well as expectations for response to treatments.    Ayala Wolf PA-C  5/17/2022  5:57 PM    HPI:    Nathan Luzmatyquinton is a 27 year old male  who presents to [facility] today for concerns of right ear pain x today onset.     Presence of the following:   No fevers or chills.  No sore throat/pharyngitis/tonsillitis.   Yes allergy/URI Symptoms  Yes Muffled Sounds/Change in Hearing  Yes Sensation of Fullness in Ear(s)  No  Ringing in Ears/Tinnitus  No Balance Changes  No Dizziness  No Headache   No Ear Drainage  Denies persistent hearing loss, foul smelling odor from ear, changes in vision, nausea, vomiting, diarrhea, chest pain, shortness of breath.     No Recent swimming/hot tub  No submerging of head in shower/bathtub.     No Recent URI or other illness  History of otitis media: Yes  History of HEENT surgery (PE tubes, tonsillectomy/adenoidectomy, etc.): Yes x 4 sets   Recent Course of ABX: No     Treatments Tried: none  Prior History of Similar Symptoms: Yes    PCP - MD Belinda    Past Medical History:   Diagnosis Date     Constipation     3/23/05,started on MiraLax and behavioral modification treatments     Encounter for adjustment and management of other implanted devices     3/23/05,T tube is out     Otitis media     History of recurrent ear infections     Past Surgical History:   Procedure Laterality Date     ADENOIDECTOMY      No Comments Provided     MYRINGOTOMY, INSERT TUBE, COMBINED      12/27/05,Vent tubes   4 times.     Social History     Tobacco Use     Smoking status: Never Smoker     Smokeless tobacco: Never Used   Substance Use Topics     Alcohol use: No     Current Outpatient Medications   Medication Sig Dispense Refill     loratadine (CLARITIN) 10 MG tablet Take 1 tablet (10 mg) by mouth daily (Patient not taking: Reported on 1/15/2021) 90 tablet 0     Allergies   Allergen Reactions     Erythromycin-Sulfisoxazole Unknown     Pediatric Multivitamins-Iron Unknown     Sulfa Drugs Unknown     Allergies to pediazole     Amoxicillin Rash     Past medical history, past surgical history, current medications and allergies reviewed and accurate to the best of my knowledge.      ROS:  Refer to HPI    /86 (BP Location: Right arm, Patient Position: Sitting, Cuff Size: Adult Large)   Pulse 94   Temp 98  F (36.7  C) (Tympanic)   Resp 20   Wt 140.8 kg (310 lb 6.4 oz)   SpO2 97%   BMI 47.20 kg/m      EXAM:  General  Appearance: Well appearing 27 year old male, appropriate appearance for age. No acute distress   Ears: Left TM intact, translucent with bony landmarks appreciated, no erythema, no effusion, no bulging, no purulence.  Right TM intact, erythematous, bulging, purulence noted.  Left auditory canal clear.  Right auditory canal clear.  Normal external ears, non tender.  Eyes: conjunctivae normal without erythema or irritation, corneas clear, no drainage or crusting, no eyelid swelling, pupils equal   Oropharynx: moist mucous membranes, posterior pharynx without erythema, tonsils symmetric, no erythema, no exudates or petechiae, no post nasal drip seen, no trismus, voice clear.    Sinuses:  No sinus tenderness upon palpation of the frontal or maxillary sinuses  Nose:  Bilateral nares: no erythema, no edema, no drainage or congestion   Neck: supple without adenopathy  Respiratory: normal chest wall and respirations.  Normal effort.  Clear to auscultation bilaterally, no wheezing, crackles or rhonchi.  No increased work of breathing.  No cough appreciated.  Cardiac: RRR with no murmurs  Dermatological: no rashes noted of exposed skin  Psychological: normal affect, alert, oriented, and pleasant.     Labs:  None     Xray:  None

## 2022-05-17 NOTE — PATIENT INSTRUCTIONS
"You were prescribed an antibiotic, please take into consideration the following information:  - Take entire course of antibiotic even if you start to feel better.  - Antibiotics can cause stomach upset including nausea and diarrhea. Read your bottle or ask the pharmacist if antibiotic can be taken with food to help prevent nausea. If you have symptoms of diarrhea you can take an over-the-counter probiotic and/or increase foods with probiotics such as yogurt, [place], sauerkraut.  -Use caution in sunlight as can lead to increased risk of sunburn while on ABX (antibiotics).     Please refer to your AVS for follow up and pain/symptoms management recommendations (I.e.: medications, helpful conservative treatment modalities, appropriate follow up if need to a specialist or family practice, etc.). Please return to urgent care if your symptoms change or worsen.     Discharge instructions:  -If you were prescribed a medication(s), please take this as prescribed/directed  -Monitor your symptoms, if changing/worsening, return to UC/ER or PCP for follow up    - For ear infection. Take entire course of antibiotics to ensure this clears (even if feeling better).  - Tylenol or ibuprofen for pain and fevers.   - Eat yogurt, kefir or take over-the-counter \"probiotic\" at least 2 hours before or after a dose of antibiotic. This will replace good bacteria that may have been lost due to the antibiotic. (This may also help to prevent yeast infections and upset stomach during the course of antibiotic.)  - In the future at onset of congestion: Blow nose or use bulb syringe to keep nasal congestion cleared and use saline nasal spray/flush.  -Alternative ibuprofen and tylenol as needed.   -Rest/relaxation and keeping hydrated with clear liquids (ie: water or gatorade). Using a humidifier may be beneficial as well.     * Recheck with family practice as needed or ER sooner with worsening or concerns.     "

## 2022-05-17 NOTE — NURSING NOTE
"Chief Complaint   Patient presents with     Consult     Right ear pain     Patient is here for pain in the right ear that started today.     Initial /86 (BP Location: Right arm, Patient Position: Sitting, Cuff Size: Adult Large)   Pulse 94   Temp 98  F (36.7  C) (Tympanic)   Resp 20   Wt 140.8 kg (310 lb 6.4 oz)   SpO2 97%   BMI 47.20 kg/m   Estimated body mass index is 47.2 kg/m  as calculated from the following:    Height as of 1/22/22: 1.727 m (5' 8\").    Weight as of this encounter: 140.8 kg (310 lb 6.4 oz).  Medication Reconciliation: complete    Jerilyn Ames LPN  "

## 2022-10-27 ENCOUNTER — OFFICE VISIT (OUTPATIENT)
Dept: FAMILY MEDICINE | Facility: OTHER | Age: 27
End: 2022-10-27
Attending: PHYSICIAN ASSISTANT
Payer: COMMERCIAL

## 2022-10-27 VITALS
DIASTOLIC BLOOD PRESSURE: 88 MMHG | OXYGEN SATURATION: 98 % | RESPIRATION RATE: 16 BRPM | WEIGHT: 315 LBS | SYSTOLIC BLOOD PRESSURE: 128 MMHG | BODY MASS INDEX: 48.49 KG/M2 | TEMPERATURE: 98.3 F | HEART RATE: 94 BPM

## 2022-10-27 DIAGNOSIS — H10.31 ACUTE CONJUNCTIVITIS OF RIGHT EYE, UNSPECIFIED ACUTE CONJUNCTIVITIS TYPE: Primary | ICD-10-CM

## 2022-10-27 DIAGNOSIS — E66.01 MORBID OBESITY (H): ICD-10-CM

## 2022-10-27 PROCEDURE — 99213 OFFICE O/P EST LOW 20 MIN: CPT | Performed by: NURSE PRACTITIONER

## 2022-10-27 PROCEDURE — G0463 HOSPITAL OUTPT CLINIC VISIT: HCPCS

## 2022-10-27 RX ORDER — POLYMYXIN B SULFATE AND TRIMETHOPRIM 1; 10000 MG/ML; [USP'U]/ML
1-2 SOLUTION OPHTHALMIC EVERY 6 HOURS
Qty: 2 ML | Refills: 0 | Status: SHIPPED | OUTPATIENT
Start: 2022-10-27 | End: 2022-11-01

## 2022-10-27 ASSESSMENT — PAIN SCALES - GENERAL: PAINLEVEL: MODERATE PAIN (4)

## 2022-10-27 NOTE — NURSING NOTE
"Chief Complaint   Patient presents with     Eye Problem       FOOD SECURITY SCREENING QUESTIONS  Hunger Vital Signs:  Within the past 12 months we worried whether our food would run out before we got money to buy more. Never  Within the past 12 months the food we bought just didn't last and we didn't have money to get more. Never  Mare Parsons LPN 10/27/2022 6:33 PM      Initial /88 (BP Location: Right arm, Patient Position: Sitting, Cuff Size: Adult Large)   Pulse 94   Temp 98.3  F (36.8  C) (Tympanic)   Resp 16   Wt 144.7 kg (318 lb 14.4 oz)   SpO2 98%   BMI 48.49 kg/m   Estimated body mass index is 48.49 kg/m  as calculated from the following:    Height as of 1/22/22: 1.727 m (5' 8\").    Weight as of this encounter: 144.7 kg (318 lb 14.4 oz).  Medication Reconciliation: complete    Mare Parsons LPN  "

## 2022-10-27 NOTE — PROGRESS NOTES
ASSESSMENT/PLAN:    I have reviewed the nursing notes.  I have reviewed the findings, diagnosis, plan and need for follow up with the patient.    1. Acute conjunctivitis of right eye, unspecified acute conjunctivitis type  - trimethoprim-polymyxin b (POLYTRIM) 17753-1.1 UNIT/ML-% ophthalmic solution; Place 1-2 drops into the right eye every 6 hours for 5 days  Dispense: 2 mL; Refill: 0  Did not stain the eye but symptoms appear consistent with conjunctivitis, treating as such. Discussed conjunctivitis also can be caused by viral illness but he is not experiencing signs of URI. Low suspicion of corneal abrasion. If worsening pain or visual changes he should seek an eye doctor's opinion.     Discussed warning signs/symptoms indicative of need to f/u    Follow up if symptoms persist or worsen or concerns    I explained my diagnostic considerations and recommendations to the patient, who voiced understanding and agreement with the treatment plan. All questions were answered. We discussed potential side effects of any prescribed or recommended therapies, as well as expectations for response to treatments.    Amanda Branham NP  10/27/2022  6:56 PM    HPI:  Nathan Wells is a 27 year old male who presents to [facility] today for concerns of right eye pain, redness, and stinging.  He woke up with this on Tuesday morning. Yesterday was worse than today however. 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 There is some stinging. A little blurry at times, nothing that impairs vision. Denies any contact lens use, always wears glasses. No significant crusting or drainage. He was having light sensitivity this morning. Earlier today he felt more 'pressure' than now. Not really watering. Slight runny nose. No headaches. No known exposure to pink eye.     He has taken some ibuprofen, excederin. 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     Not diabetic.     ROS otherwise negative.     Past Medical History:   Diagnosis Date     Constipation     3/23/05,started on MiraLax and behavioral modification treatments     Encounter for adjustment and management of other implanted devices     3/23/05,T tube is out     Otitis media     History of recurrent ear infections     Past Surgical History:   Procedure Laterality Date     ADENOIDECTOMY      No Comments Provided     MYRINGOTOMY, INSERT TUBE, COMBINED      12/27/05,Vent tubes   4 times.     Social History     Tobacco Use     Smoking status: Never     Smokeless tobacco: Never   Substance Use Topics     Alcohol use: Yes     No current outpatient medications on file.     Allergies   Allergen Reactions     Erythromycin-Sulfisoxazole Unknown     Pediatric Multivitamins-Iron Unknown     Sulfa Drugs Unknown     Allergies to pediazole     Amoxicillin Rash     Past medical history, past surgical history, current medications and allergies reviewed and accurate to the best of my knowledge.      ROS:  Refer to HPI    /88 (BP Location: Right arm, Patient Position: Sitting, Cuff Size: Adult Large)   Pulse 94   Temp 98.3  F (36.8  C) (Tympanic)   Resp 16   Wt 144.7 kg (318 lb 14.4 oz)   SpO2 98%   BMI 48.49 kg/m      EXAM:  General Appearance: Well appearing 27 year old male, appropriate appearance for age. No acute distress   Ears: Left TM intact, translucent with bony landmarks appreciated, no erythema, no effusion, no bulging, no purulence.  Right TM intact, translucent with bony landmarks appreciated, no erythema, no effusion, no bulging, no purulence.  Left auditory canal clear.  Right auditory canal clear.  Normal external ears, non tender.  Eyes: right eye: lateral aspect of conjunctivae with erythema and irritation and some yellow crusting. left eye: conjunctivae normal without erythema or irritation, corneas clear, no drainage or crusting, no eyelid swelling, pupils equal    Oropharynx: moist mucous membranes, posterior pharynx without erythema, tonsils symmetric, no erythema, no exudates or petechiae, no post nasal drip seen, no trismus, voice clear.    Nose:  Bilateral nares: no erythema, no edema, no drainage or congestion   Neck: supple without adenopathy  Respiratory:  No increased work of breathing.  No cough appreciated.  Psychological: normal affect, alert, oriented, and pleasant.

## 2023-02-25 ENCOUNTER — ALLIED HEALTH/NURSE VISIT (OUTPATIENT)
Dept: FAMILY MEDICINE | Facility: OTHER | Age: 28
End: 2023-02-25
Payer: COMMERCIAL

## 2023-02-25 DIAGNOSIS — Z20.822 EXPOSURE TO 2019 NOVEL CORONAVIRUS: Primary | ICD-10-CM

## 2023-02-25 DIAGNOSIS — R05.9 COUGH: ICD-10-CM

## 2023-02-25 LAB — SARS-COV-2 RNA RESP QL NAA+PROBE: POSITIVE

## 2023-02-25 PROCEDURE — C9803 HOPD COVID-19 SPEC COLLECT: HCPCS

## 2023-02-25 PROCEDURE — U0003 INFECTIOUS AGENT DETECTION BY NUCLEIC ACID (DNA OR RNA); SEVERE ACUTE RESPIRATORY SYNDROME CORONAVIRUS 2 (SARS-COV-2) (CORONAVIRUS DISEASE [COVID-19]), AMPLIFIED PROBE TECHNIQUE, MAKING USE OF HIGH THROUGHPUT TECHNOLOGIES AS DESCRIBED BY CMS-2020-01-R: HCPCS | Mod: ZL

## 2023-05-06 ENCOUNTER — HEALTH MAINTENANCE LETTER (OUTPATIENT)
Age: 28
End: 2023-05-06

## 2023-10-15 ENCOUNTER — OFFICE VISIT (OUTPATIENT)
Dept: FAMILY MEDICINE | Facility: OTHER | Age: 28
End: 2023-10-15
Attending: NURSE PRACTITIONER
Payer: COMMERCIAL

## 2023-10-15 ENCOUNTER — HOSPITAL ENCOUNTER (EMERGENCY)
Facility: OTHER | Age: 28
Discharge: LEFT BEFORE DISCHARGE | End: 2023-10-15
Payer: COMMERCIAL

## 2023-10-15 VITALS
SYSTOLIC BLOOD PRESSURE: 134 MMHG | TEMPERATURE: 98.2 F | WEIGHT: 315 LBS | OXYGEN SATURATION: 97 % | BODY MASS INDEX: 49.44 KG/M2 | RESPIRATION RATE: 18 BRPM | DIASTOLIC BLOOD PRESSURE: 80 MMHG | HEART RATE: 102 BPM | HEIGHT: 67 IN

## 2023-10-15 VITALS
HEART RATE: 93 BPM | OXYGEN SATURATION: 100 % | TEMPERATURE: 97 F | DIASTOLIC BLOOD PRESSURE: 100 MMHG | SYSTOLIC BLOOD PRESSURE: 162 MMHG | RESPIRATION RATE: 18 BRPM

## 2023-10-15 DIAGNOSIS — K08.89 PAIN, DENTAL: ICD-10-CM

## 2023-10-15 DIAGNOSIS — K04.7 DENTAL INFECTION: Primary | ICD-10-CM

## 2023-10-15 PROCEDURE — G0463 HOSPITAL OUTPT CLINIC VISIT: HCPCS

## 2023-10-15 PROCEDURE — 99213 OFFICE O/P EST LOW 20 MIN: CPT | Performed by: NURSE PRACTITIONER

## 2023-10-15 RX ORDER — PROPRANOLOL HYDROCHLORIDE 20 MG/1
1 TABLET ORAL 3 TIMES DAILY
COMMUNITY
Start: 2023-07-27

## 2023-10-15 RX ORDER — CEFDINIR 300 MG/1
300 CAPSULE ORAL 2 TIMES DAILY
Qty: 20 CAPSULE | Refills: 0 | Status: SHIPPED | OUTPATIENT
Start: 2023-10-15 | End: 2023-10-25

## 2023-10-15 RX ORDER — ACETAMINOPHEN 500 MG
1000 TABLET ORAL EVERY 6 HOURS PRN
Qty: 30 TABLET | Refills: 0 | Status: SHIPPED | OUTPATIENT
Start: 2023-10-15

## 2023-10-15 RX ORDER — METRONIDAZOLE 500 MG/1
500 TABLET ORAL 3 TIMES DAILY
Qty: 21 TABLET | Refills: 0 | Status: SHIPPED | OUTPATIENT
Start: 2023-10-15 | End: 2023-10-22

## 2023-10-15 RX ORDER — IBUPROFEN 800 MG/1
800 TABLET, FILM COATED ORAL EVERY 6 HOURS PRN
Qty: 30 TABLET | Refills: 0 | Status: SHIPPED | OUTPATIENT
Start: 2023-10-15

## 2023-10-15 ASSESSMENT — PAIN SCALES - GENERAL: PAINLEVEL: SEVERE PAIN (7)

## 2023-10-15 NOTE — PROGRESS NOTES
ASSESSMENT/PLAN:    I have reviewed the nursing notes.  I have reviewed the findings, diagnosis, plan and need for follow up with the patient.    1. Dental infection  - cefdinir (OMNICEF) 300 MG capsule; Take 1 capsule (300 mg) by mouth 2 times daily for 10 days  Dispense: 20 capsule; Refill: 0  - metroNIDAZOLE (FLAGYL) 500 MG tablet; Take 1 tablet (500 mg) by mouth 3 times daily for 7 days  Dispense: 21 tablet; Refill: 0  - ibuprofen (ADVIL/MOTRIN) 800 MG tablet; Take 1 tablet (800 mg) by mouth every 6 hours as needed for moderate pain  Dispense: 30 tablet; Refill: 0    2. Pain, dental  - ibuprofen (ADVIL/MOTRIN) 800 MG tablet; Take 1 tablet (800 mg) by mouth every 6 hours as needed for moderate pain  Dispense: 30 tablet; Refill: 0  - acetaminophen (TYLENOL) 500 MG tablet; Take 2 tablets (1,000 mg) by mouth every 6 hours as needed for mild pain  Dispense: 30 tablet; Refill: 0  - May use over-the-counter Tylenol or ibuprofen PRN  Recommend dental follow up in 1-2 weeks, call to schedule tomorrow and ask if they would like to see him before patient is done with antibiotics or after for concern of dental infection without abscess or facial swelling. Recommend regular dental care.     Discussed warning signs/symptoms indicative of need to f/u    Follow up if symptoms persist or worsen or concerns    I explained my diagnostic considerations and recommendations to the patient, who voiced understanding and agreement with the treatment plan. All questions were answered. We discussed potential side effects of any prescribed or recommended therapies, as well as expectations for response to treatments.    Amanda Branham NP  10/15/2023  2:50 PM    HPI:  Nathan Wells is a 28 year old male who presents to [facility] today for concerns of right lower molar pain that started 4 days ago. Patient states he has a cavity but is unable to see if the tooth is infected. He has taken ibuprofen and clove oil for treatment.      Thought initially maybe it was TMJ he was dealing with. Has been using clove oil regularly and it give only mild improvement from symptoms.     [facility] dental office.     He can feel mild facial swelling but it is not visible. No fevers.     Does not get regular dental care.     ROS otherwise negative.     Past Medical History:   Diagnosis Date    Constipation     3/23/05,started on MiraLax and behavioral modification treatments    Encounter for adjustment and management of other implanted devices     3/23/05,T tube is out    Otitis media     History of recurrent ear infections     Past Surgical History:   Procedure Laterality Date    ADENOIDECTOMY      No Comments Provided    MYRINGOTOMY, INSERT TUBE, COMBINED      12/27/05,Vent tubes   4 times.     Social History     Tobacco Use    Smoking status: Never    Smokeless tobacco: Never   Substance Use Topics    Alcohol use: Yes     Current Outpatient Medications   Medication Sig Dispense Refill    acetaminophen (TYLENOL) 500 MG tablet Take 2 tablets (1,000 mg) by mouth every 6 hours as needed for mild pain 30 tablet 0    cefdinir (OMNICEF) 300 MG capsule Take 1 capsule (300 mg) by mouth 2 times daily for 10 days 20 capsule 0    ibuprofen (ADVIL/MOTRIN) 800 MG tablet Take 1 tablet (800 mg) by mouth every 6 hours as needed for moderate pain 30 tablet 0    metroNIDAZOLE (FLAGYL) 500 MG tablet Take 1 tablet (500 mg) by mouth 3 times daily for 7 days 21 tablet 0    omeprazole (PRILOSEC) 20 MG DR capsule Take 20 mg by mouth      propranolol (INDERAL) 20 MG tablet Take 1 tablet by mouth 3 times daily (Patient not taking: Reported on 10/15/2023)       Allergies   Allergen Reactions    Erythromycin Base     Erythromycin-Sulfisoxazole Unknown    Other Environmental Allergy     Pediatric Multivitamins-Iron Unknown    Penicillins     Sulfa Antibiotics Unknown     Allergies to pediazole    Amoxicillin Rash     Past medical history, past surgical history, current medications  "and allergies reviewed and accurate to the best of my knowledge.      ROS:  Refer to HPI    /80 (BP Location: Left arm, Patient Position: Sitting, Cuff Size: Adult Large)   Pulse 102   Temp 98.2  F (36.8  C) (Tympanic)   Resp 18   Ht 1.702 m (5' 7\")   Wt (!) 159.5 kg (351 lb 9.6 oz)   SpO2 97%   BMI 55.07 kg/m      EXAM:  General Appearance: Well appearing 28 year old male, appropriate appearance for age. No acute distress   Oropharynx: moist mucous membranes, posterior pharynx without erythema, tonsils symmetric, no erythema, no exudates or petechiae, no post nasal drip seen, no trismus, voice clear.  + generally poor dentition, several of the molars show some dark decay at the gumline. There is tenderness over the right TMJ.  Tenderness to the lower, posterior gumline and erythema without drainage observed.   Sinuses:  No sinus tenderness upon palpation of the frontal or maxillary sinuses  Nose:  Bilateral nares: no erythema, no edema, no drainage or congestion   Neck: supple without adenopathy  Respiratory: normal chest wall and respirations.  Normal effort.  Clear to auscultation bilaterally, no wheezing, crackles or rhonchi.  No increased work of breathing.  No cough appreciated.  Cardiac: RRR with no murmurs  Musculoskeletal:  Equal movement of bilateral upper extremities.  Equal movement of bilateral lower extremities.  Normal gait.    Neuro: Alert and oriented to person, place, and time.    Psychological: normal affect, alert, oriented, and pleasant.   "

## 2023-10-15 NOTE — ED TRIAGE NOTES
Pt here for jaw pain.  [Pt states that since last Thursday, he has had right jaw pain,.  Pt does report some dental carries and is not sure if this is what is causing the pain or not.     Triage Assessment (Adult)       Row Name 10/15/23 0531          Triage Assessment    Airway WDL WDL        Respiratory WDL    Respiratory WDL WDL        Cardiac WDL    Cardiac WDL WDL     Cardiac Rhythm NSR        Peripheral/Neurovascular WDL    Peripheral Neurovascular WDL WDL        Cognitive/Neuro/Behavioral WDL    Cognitive/Neuro/Behavioral WDL WDL

## 2023-10-15 NOTE — PATIENT INSTRUCTIONS
Ibupofen every 8 hours (800 mg) and stager with 1000 mg of tylenol every 8 hours.    Example:   Ibuprofen at 4 PM  Tylenol at 8 PM  Ibuprofen at midnight  Tylenol at 4 AM

## 2023-10-15 NOTE — PROGRESS NOTES
Patient presents to the clinic for right lower molar pain that started 4 days ago. Patient states he has a cavity but is unable to see if the tooth is infected. He has taken ibuprofen and clove oil for treatment.        FOOD SECURITY SCREENING QUESTIONS  Hunger Vital Signs:  Within the past 12 months we worried whether our food would run out before we got money to buy more. Never  Within the past 12 months the food we bought just didn't last and we didn't have money to get more. Never  Medication Reconciliation: complete  Shirley Cobian CMA 10/15/2023 2:31 PM

## 2024-07-13 ENCOUNTER — HEALTH MAINTENANCE LETTER (OUTPATIENT)
Age: 29
End: 2024-07-13

## 2024-10-27 ENCOUNTER — OFFICE VISIT (OUTPATIENT)
Dept: FAMILY MEDICINE | Facility: OTHER | Age: 29
End: 2024-10-27
Attending: NURSE PRACTITIONER
Payer: COMMERCIAL

## 2024-10-27 VITALS
OXYGEN SATURATION: 96 % | TEMPERATURE: 98.4 F | HEART RATE: 93 BPM | DIASTOLIC BLOOD PRESSURE: 88 MMHG | BODY MASS INDEX: 50.62 KG/M2 | SYSTOLIC BLOOD PRESSURE: 130 MMHG | WEIGHT: 315 LBS | RESPIRATION RATE: 16 BRPM | HEIGHT: 66 IN

## 2024-10-27 DIAGNOSIS — H66.003 NON-RECURRENT ACUTE SUPPURATIVE OTITIS MEDIA OF BOTH EARS WITHOUT SPONTANEOUS RUPTURE OF TYMPANIC MEMBRANES: Primary | ICD-10-CM

## 2024-10-27 PROCEDURE — 99213 OFFICE O/P EST LOW 20 MIN: CPT | Performed by: STUDENT IN AN ORGANIZED HEALTH CARE EDUCATION/TRAINING PROGRAM

## 2024-10-27 RX ORDER — CEFDINIR 300 MG/1
300 CAPSULE ORAL 2 TIMES DAILY
Qty: 20 CAPSULE | Refills: 0 | Status: SHIPPED | OUTPATIENT
Start: 2024-10-27 | End: 2024-11-06

## 2024-10-27 ASSESSMENT — PAIN SCALES - GENERAL: PAINLEVEL_OUTOF10: MILD PAIN (3)

## 2024-10-27 NOTE — NURSING NOTE
"Chief Complaint   Patient presents with    Otalgia     Right x 2 days       Initial /88   Pulse 93   Temp 98.4  F (36.9  C) (Tympanic)   Resp 16   Ht 1.676 m (5' 6\")   Wt (!) 159.2 kg (351 lb)   SpO2 96%   BMI 56.65 kg/m   Estimated body mass index is 56.65 kg/m  as calculated from the following:    Height as of this encounter: 1.676 m (5' 6\").    Weight as of this encounter: 159.2 kg (351 lb).  Medication Review: complete    The next two questions are to help us understand your food security.  If you are feeling you need any assistance in this area, we have resources available to support you today.           No data to display                  Health Care Directive:  Patient does not have a Health Care Directive: Discussed advance care planning with patient; however, patient declined at this time.    Norma J. Gosselin, LPN      "

## 2024-10-27 NOTE — PATIENT INSTRUCTIONS
Ear Infection    Cefdinir twice a day for 10 days.    Tylenol, ibuprofen.    Follow up as needed.  [facility] if symptoms worsen or change.

## 2024-10-27 NOTE — PROGRESS NOTES
"  Assessment & Plan     (H66.003) Non-recurrent acute suppurative otitis media of both ears without spontaneous rupture of tympanic membranes  (primary encounter diagnosis)    Comment: Bilateral otitis media.  He does have history significant for multiple tympanostomy tubes.  Vital signs are stable.  Tolerating oral intake.    Plan: cefdinir (OMNICEF) 300 MG capsule      Allergies to penicillins, plan cefdinir twice a day for 10 days.  Continue over-the-counter management.  Follow-up with PCP for any persisting symptoms.  Return to [facility] or [facility] for any worsening or changing symptoms.  He is comfortable and agreeable with this plan.    Farzana Evans is a 29 year old, presenting for the following health issues:  Otalgia (Right x 2 days)    HPI    Patient presents today with ear pain.  He notes over the past couple of weeks he has had cold type symptoms.  Over the past few days he has developed ear pain, both the right and left ears.  He notes no notable drainage.  He feels slight swelling of the ears.  He has been using over-the-counter ibuprofen and Tylenol as needed.  No notable fevers.      Review of Systems  Constitutional, HEENT, cardiovascular, pulmonary, gi and gu systems are negative, except as otherwise noted.        Objective    /88   Pulse 93   Temp 98.4  F (36.9  C) (Tympanic)   Resp 16   Ht 1.676 m (5' 6\")   Wt (!) 159.2 kg (351 lb)   SpO2 96%   BMI 56.65 kg/m    Body mass index is 56.65 kg/m .    Physical Exam   GENERAL: alert and no distress  EYES: Eyes grossly normal to inspection, PERRL and conjunctivae and sclerae normal  HENT: ear canals clear, left TM slightly bulging, erythematous, suppurative fluid, right TM bulging, erythematous, suppurative fluid, nose and mouth without ulcers or lesions  NECK: no adenopathy, no asymmetry, masses, or scars  RESP: lungs clear to auscultation - no rales, rhonchi or wheezes  CV: regular rate and rhythm, normal S1 S2, no S3 or S4, no " murmur, click or rub, no peripheral edema  MS: no gross musculoskeletal defects noted, no edema        Signed Electronically by: Patti Almonte PA-C

## 2025-04-28 ENCOUNTER — OFFICE VISIT (OUTPATIENT)
Dept: FAMILY MEDICINE | Facility: OTHER | Age: 30
End: 2025-04-28
Attending: STUDENT IN AN ORGANIZED HEALTH CARE EDUCATION/TRAINING PROGRAM
Payer: COMMERCIAL

## 2025-04-28 VITALS
TEMPERATURE: 98.1 F | SYSTOLIC BLOOD PRESSURE: 128 MMHG | BODY MASS INDEX: 49.44 KG/M2 | DIASTOLIC BLOOD PRESSURE: 82 MMHG | RESPIRATION RATE: 19 BRPM | HEIGHT: 67 IN | OXYGEN SATURATION: 97 % | HEART RATE: 102 BPM | WEIGHT: 315 LBS

## 2025-04-28 DIAGNOSIS — H60.391 INFECTIVE OTITIS EXTERNA, RIGHT: Primary | ICD-10-CM

## 2025-04-28 DIAGNOSIS — H91.91 HEARING DIFFICULTY OF RIGHT EAR: ICD-10-CM

## 2025-04-28 DIAGNOSIS — H60.91 INFLAMMATION OF RIGHT EAR CANAL: ICD-10-CM

## 2025-04-28 PROCEDURE — 1126F AMNT PAIN NOTED NONE PRSNT: CPT

## 2025-04-28 PROCEDURE — 99213 OFFICE O/P EST LOW 20 MIN: CPT

## 2025-04-28 PROCEDURE — 3079F DIAST BP 80-89 MM HG: CPT

## 2025-04-28 PROCEDURE — 250N000009 HC RX 250

## 2025-04-28 PROCEDURE — 3074F SYST BP LT 130 MM HG: CPT

## 2025-04-28 RX ORDER — DEXAMETHASONE SODIUM PHOSPHATE 4 MG/ML
12 VIAL (ML) INJECTION ONCE
Status: COMPLETED | OUTPATIENT
Start: 2025-04-28 | End: 2025-04-28

## 2025-04-28 RX ORDER — OFLOXACIN 3 MG/ML
5 SOLUTION AURICULAR (OTIC) DAILY
Qty: 10 ML | Refills: 0 | Status: SHIPPED | OUTPATIENT
Start: 2025-04-28 | End: 2025-05-08

## 2025-04-28 RX ADMIN — DEXAMETHASONE SODIUM PHOSPHATE 12 MG: 4 INJECTION, SOLUTION INTRAMUSCULAR; INTRAVENOUS at 12:53

## 2025-04-28 ASSESSMENT — PAIN SCALES - GENERAL: PAINLEVEL_OUTOF10: NO PAIN (0)

## 2025-04-28 NOTE — NURSING NOTE
"Chief Complaint   Patient presents with    Ear Problem     Patient here for right ear itching and feels like it is closing x1 week.     Initial /82   Pulse 102   Temp 98.1  F (36.7  C) (Tympanic)   Resp 19   Ht 1.702 m (5' 7\")   Wt (!) 155.6 kg (343 lb)   SpO2 97%   BMI 53.72 kg/m   Estimated body mass index is 53.72 kg/m  as calculated from the following:    Height as of this encounter: 1.702 m (5' 7\").    Weight as of this encounter: 155.6 kg (343 lb).  Medication Review: complete    The next two questions are to help us understand your food security.  If you are feeling you need any assistance in this area, we have resources available to support you today.          4/28/2025   SDOH- Food Insecurity   Within the past 12 months, did you worry that your food would run out before you got money to buy more? N   Within the past 12 months, did the food you bought just not last and you didn t have money to get more? N         Health Care Directive:  Patient does not have a Health Care Directive: Discussed advance care planning with patient; however, patient declined at this time.    Mable Rivers LPN      "

## 2025-04-28 NOTE — PROGRESS NOTES
ASSESSMENT/PLAN:    I have reviewed the nursing notes.  I have reviewed the findings, diagnosis, plan and need for follow up with the patient.    1. Hearing difficulty of right ear  2. Inflammation of right ear canal  3. Infective otitis externa, right (Primary)    - ofloxacin (FLOXIN) 0.3 % otic solution; Place 5 drops into the right ear daily for 10 days.  Dispense: 10 mL; Refill: 0    - dexAMETHasone (DECADRON) injectable solution used ORALLY 12 mg- administered in clinic      - Please read the attached information on otitis externa for at home care treatment.    - Symptomatic treatment - Encouraged fluids, salt water gargles, honey (only if greater than 1 year in age due to risk of botulism), elevation, humidifier, sinus rinse/netti pot, lozenges, tea, topical vapor rub, popsicles, rest, etc     - May use over-the-counter Tylenol and ibuprofen as needed for pain, inflammation or fever    - Discussed warning signs/symptoms indicative of need to f/u    - Follow up if symptoms persist or worsen or concerns    - I explained my diagnostic considerations and recommendations to the patient, who voiced understanding and agreement with the treatment plan. All questions were answered. We discussed potential side effects of any prescribed or recommended therapies, as well as expectations for response to treatments.    JESSICA Meneses CNP  4/28/2025  12:34 PM    HPI:    Nathan Wells is a 30 year old male who presents to [facility] today for concerns of possible ear infection.  Patient complaining of hearing diminished right ear and states that he felt a twinging sensation in his ear last night.  Patient states that he does have mild allergies and is currently experiencing watery eyes slight rhinorrhea, feels like his nose is extra dry along with a slight cough once in a while.  No at home care treatment.  Patient denies fever, chills, body aches, shortness of breath, chest pain, sore throat, congestion,  "headache, lightheadedness, dizziness, nausea, vomiting, diarrhea, constipation or rashes.    Past Medical History:   Diagnosis Date    Constipation     3/23/05,started on MiraLax and behavioral modification treatments    Encounter for adjustment and management of other implanted devices     3/23/05,T tube is out    Otitis media     History of recurrent ear infections     Past Surgical History:   Procedure Laterality Date    ADENOIDECTOMY      No Comments Provided    MYRINGOTOMY, INSERT TUBE, COMBINED      12/27/05,Vent tubes   4 times.     Social History     Tobacco Use    Smoking status: Never    Smokeless tobacco: Never   Substance Use Topics    Alcohol use: Not Currently     Current Outpatient Medications   Medication Sig Dispense Refill    acetaminophen (TYLENOL) 500 MG tablet Take 2 tablets (1,000 mg) by mouth every 6 hours as needed for mild pain (Patient not taking: Reported on 4/28/2025) 30 tablet 0    ibuprofen (ADVIL/MOTRIN) 800 MG tablet Take 1 tablet (800 mg) by mouth every 6 hours as needed for moderate pain (Patient not taking: Reported on 4/28/2025) 30 tablet 0    omeprazole (PRILOSEC) 20 MG DR capsule Take 20 mg by mouth      propranolol (INDERAL) 20 MG tablet Take 1 tablet by mouth 3 times daily (Patient not taking: Reported on 10/27/2024)       Allergies   Allergen Reactions    Erythromycin Base     Erythromycin-Sulfisoxazole Unknown    Other Environmental Allergy     Pediatric Multivitamins-Iron Unknown    Penicillins     Sulfa Antibiotics Unknown     Allergies to pediazole    Amoxicillin Rash     Past medical history, past surgical history, current medications and allergies reviewed and accurate to the best of my knowledge.      ROS:  Refer to HPI    /82   Pulse 102   Temp 98.1  F (36.7  C) (Tympanic)   Resp 19   Ht 1.702 m (5' 7\")   Wt (!) 155.6 kg (343 lb)   SpO2 97%   BMI 53.72 kg/m      EXAM:  General Appearance: Well appearing 30 year old male, appropriate appearance for age. No " acute distress   Ears: Left TM intact, translucent with bony landmarks appreciated, no erythema, no effusion, no bulging, no purulence.  Right TM intact, translucent with bony landmarks appreciated, no erythema, no effusion, no bulging, no purulence.  Left auditory canal clear.  Right auditory canal erythematous and edematous.  Normal external ears, non tender.  Eyes: conjunctivae normal without erythema or irritation, corneas clear, no drainage or crusting, no eyelid swelling, pupils equal   Neck: supple without adenopathy  Respiratory: normal chest wall and respirations.  Normal effort.  Clear to auscultation bilaterally, no wheezing, crackles or rhonchi.  No increased work of breathing.  No cough appreciated.  Cardiac: RRR with no murmurs  Musculoskeletal:  Equal movement of bilateral upper extremities.  Equal movement of bilateral lower extremities.  Normal gait.    Neuro: Alert and oriented to person, place, and time.    Psychological: normal affect, alert, oriented, and pleasant.

## 2025-07-19 ENCOUNTER — HEALTH MAINTENANCE LETTER (OUTPATIENT)
Age: 30
End: 2025-07-19

## (undated) RX ORDER — SODIUM CHLORIDE 9 MG/ML
INJECTION, SOLUTION INTRAVENOUS
Status: DISPENSED
Start: 2022-01-22

## (undated) RX ORDER — TETRACAINE HYDROCHLORIDE 5 MG/ML
SOLUTION OPHTHALMIC
Status: DISPENSED
Start: 2020-04-09

## (undated) RX ORDER — CEFTRIAXONE SODIUM 1 G
VIAL (EA) INJECTION
Status: DISPENSED
Start: 2020-03-01

## (undated) RX ORDER — KETOROLAC TROMETHAMINE 15 MG/ML
INJECTION, SOLUTION INTRAMUSCULAR; INTRAVENOUS
Status: DISPENSED
Start: 2022-01-22

## (undated) RX ORDER — DEXAMETHASONE SODIUM PHOSPHATE 10 MG/ML
INJECTION, SOLUTION INTRAMUSCULAR; INTRAVENOUS
Status: DISPENSED
Start: 2022-01-22

## (undated) RX ORDER — KETOROLAC TROMETHAMINE 30 MG/ML
INJECTION, SOLUTION INTRAMUSCULAR; INTRAVENOUS
Status: DISPENSED
Start: 2020-03-01

## (undated) RX ORDER — DEXAMETHASONE SODIUM PHOSPHATE 4 MG/ML
INJECTION, SOLUTION INTRA-ARTICULAR; INTRALESIONAL; INTRAMUSCULAR; INTRAVENOUS; SOFT TISSUE
Status: DISPENSED
Start: 2025-04-28

## (undated) RX ORDER — ALBUTEROL SULFATE 90 UG/1
AEROSOL, METERED RESPIRATORY (INHALATION)
Status: DISPENSED
Start: 2022-01-22

## (undated) RX ORDER — LIDOCAINE HYDROCHLORIDE 10 MG/ML
INJECTION, SOLUTION EPIDURAL; INFILTRATION; INTRACAUDAL; PERINEURAL
Status: DISPENSED
Start: 2020-03-01

## (undated) RX ORDER — SODIUM CHLORIDE FOR INHALATION 0.9 %
VIAL, NEBULIZER (ML) INHALATION
Status: DISPENSED
Start: 2020-04-09

## (undated) RX ORDER — TROPICAMIDE 5 MG/ML
SOLUTION/ DROPS OPHTHALMIC
Status: DISPENSED
Start: 2020-04-09

## (undated) RX ORDER — ONDANSETRON 2 MG/ML
INJECTION INTRAMUSCULAR; INTRAVENOUS
Status: DISPENSED
Start: 2022-01-22